# Patient Record
Sex: FEMALE | Race: BLACK OR AFRICAN AMERICAN | NOT HISPANIC OR LATINO | URBAN - METROPOLITAN AREA
[De-identification: names, ages, dates, MRNs, and addresses within clinical notes are randomized per-mention and may not be internally consistent; named-entity substitution may affect disease eponyms.]

---

## 2024-06-04 ENCOUNTER — INPATIENT (INPATIENT)
Facility: HOSPITAL | Age: 44
LOS: 9 days | Discharge: HOME | End: 2024-06-14
Attending: PSYCHIATRY & NEUROLOGY | Admitting: PSYCHIATRY & NEUROLOGY
Payer: COMMERCIAL

## 2024-06-04 VITALS
OXYGEN SATURATION: 99 % | RESPIRATION RATE: 19 BRPM | DIASTOLIC BLOOD PRESSURE: 79 MMHG | SYSTOLIC BLOOD PRESSURE: 120 MMHG | HEART RATE: 76 BPM | WEIGHT: 139.99 LBS | TEMPERATURE: 98 F

## 2024-06-04 LAB
BASOPHILS # BLD AUTO: 0.04 K/UL — SIGNIFICANT CHANGE UP (ref 0–0.2)
BASOPHILS NFR BLD AUTO: 0.5 % — SIGNIFICANT CHANGE UP (ref 0–2)
EOSINOPHIL # BLD AUTO: 0.11 K/UL — SIGNIFICANT CHANGE UP (ref 0–0.5)
EOSINOPHIL NFR BLD AUTO: 1.5 % — SIGNIFICANT CHANGE UP (ref 0–6)
HCT VFR BLD CALC: 38.5 % — SIGNIFICANT CHANGE UP (ref 34.5–45)
HGB BLD-MCNC: 13.1 G/DL — SIGNIFICANT CHANGE UP (ref 11.5–15.5)
IANC: 4.69 K/UL — SIGNIFICANT CHANGE UP (ref 1.8–7.4)
IMM GRANULOCYTES NFR BLD AUTO: 0.3 % — SIGNIFICANT CHANGE UP (ref 0–0.9)
LYMPHOCYTES # BLD AUTO: 2.02 K/UL — SIGNIFICANT CHANGE UP (ref 1–3.3)
LYMPHOCYTES # BLD AUTO: 27.4 % — SIGNIFICANT CHANGE UP (ref 13–44)
MCHC RBC-ENTMCNC: 30.3 PG — SIGNIFICANT CHANGE UP (ref 27–34)
MCHC RBC-ENTMCNC: 34 GM/DL — SIGNIFICANT CHANGE UP (ref 32–36)
MCV RBC AUTO: 89.1 FL — SIGNIFICANT CHANGE UP (ref 80–100)
MONOCYTES # BLD AUTO: 0.49 K/UL — SIGNIFICANT CHANGE UP (ref 0–0.9)
MONOCYTES NFR BLD AUTO: 6.6 % — SIGNIFICANT CHANGE UP (ref 2–14)
NEUTROPHILS # BLD AUTO: 4.69 K/UL — SIGNIFICANT CHANGE UP (ref 1.8–7.4)
NEUTROPHILS NFR BLD AUTO: 63.7 % — SIGNIFICANT CHANGE UP (ref 43–77)
NRBC # BLD: 0 /100 WBCS — SIGNIFICANT CHANGE UP (ref 0–0)
NRBC # FLD: 0 K/UL — SIGNIFICANT CHANGE UP (ref 0–0)
PLATELET # BLD AUTO: 215 K/UL — SIGNIFICANT CHANGE UP (ref 150–400)
RBC # BLD: 4.32 M/UL — SIGNIFICANT CHANGE UP (ref 3.8–5.2)
RBC # FLD: 12.5 % — SIGNIFICANT CHANGE UP (ref 10.3–14.5)
TOXICOLOGY SCREEN, DRUGS OF ABUSE, SERUM RESULT: SIGNIFICANT CHANGE UP
WBC # BLD: 7.37 K/UL — SIGNIFICANT CHANGE UP (ref 3.8–10.5)
WBC # FLD AUTO: 7.37 K/UL — SIGNIFICANT CHANGE UP (ref 3.8–10.5)

## 2024-06-04 PROCEDURE — 99285 EMERGENCY DEPT VISIT HI MDM: CPT

## 2024-06-04 NOTE — ED ADULT NURSE NOTE - CHIEF COMPLAINT QUOTE
Pt c/o suicidal ideation with "plans to drive car off the road". States increasingly stressed at home. Denies homicidal ideation, ETOH or drug use, denies A/H, V/H. Denies hx. Calm and cooperative at present.    Checo [phone]

## 2024-06-04 NOTE — ED BEHAVIORAL HEALTH NOTE - BEHAVIORAL HEALTH NOTE
As per request of provider, writer contacted met with pt’s  Checo [phone] to obtain collateral information. the following information is per the .     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 Pt endorsed SI saying she felt like giving up.  says pt even mentioned that if she happened to pass away to the children that daddy would be there to take care of them.    Symptoms/Hx:   reports pt is endorsing SI today. he is unaware of any intention or plan. He says pt has no past suicide attempts and has never mentioned feeling suicidal in the past. He says pt has not endorsed any AVH, paranoia or delusions and says there is no hx of this. he says pt  has been sleeping eating and showering at baseline. He reports today pt was tearful. He suspects pt has been holding this in for a while but says there was no warning signs.    Baseline: prays, goes to work cares for the children. He says she is loving and caring. Pt seemed at baseline yesterday.    Stressors: mother  in April. 7 yo child was sick and in the hospital for 2 days. Pt got written up at work.    Medical problems:  none reported.    Medication: none currently.    Treatment team: pt has no psych hx.    Drug/alcohol use:none reported.    Family hx: none reported.    Violence/aggression: pt is not violent or aggressive. Pt has no access to firearms or weapons.    Dispo:   feels pt would benefit from admission. As per request of provider, writer contacted met with pt’s  Checo [phone] to obtain collateral information. the following information is per the .     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 Pt endorsed SI saying she felt like giving up.  says pt even mentioned that if she happened to pass away to the children that daddy would be there to take care of them.    Symptoms/Hx:   reports pt is endorsing SI today. he is unaware of any intention or plan. He says pt has no past suicide attempts and has never mentioned feeling suicidal in the past. He says pt has not endorsed any AVH, paranoia or delusions and says there is no hx of this. he says pt  has been sleeping eating and showering at baseline. He reports today pt was tearful. He suspects pt has been holding this in for a while but says there was no warning signs.    Baseline: prays, goes to work cares for the children. He says she is loving and caring. Pt seemed at baseline yesterday.    Stressors: mother  in April. 7 yo child was sick and in the hospital for 2 days. Pt got written up at work.    Medical problems:  none reported.    Medication: none currently.    Treatment team: pt has no psych hx.    Drug/alcohol use:none reported.    Family hx: none reported.    Violence/aggression: pt is not violent or aggressive. Pt has no access to firearms or weapons.    Dispo:   feels pt would benefit from admission.    Writer informed  pt would be admitted and is boarding due to bed availability.

## 2024-06-04 NOTE — ED PROVIDER NOTE - PSYCHIATRIC [-], MLM
PM Shift Note    Pt attended wrapup group. Pt appeared to be guarded. Pt did not earn points for reward time. Pt had vanilla ice cream for her snack. Pt used the phone. Pt did take shower. Pt was compliant with night time medications. No concerns reported or observed. Pt was seen sleeping around 2115 and has been sleeping good.  Pt contracted for safety and denies SI/HI/AVH. Q 15 min safety rounds maintained.    no insomnia

## 2024-06-04 NOTE — ED PROVIDER NOTE - NSFOLLOWUPINSTRUCTIONS_ED_ALL_ED_FT
Follow up with your PMD within 48-72 hrs. Show copies of your reports given to you.   Worsening, continued or new concerning symptoms return to the emergency department.    You have been given information necessary to follow up with the  [facility] & other outpatient  psychiatric clinics within your community    • [facility] walk in Crisis centre  [address] [place] [address]  [phone] [email or web address]  Hours of operation:  Day	                                        Hours  Sunday                                  Closed  Monday                                9am - 3pm  Tuesday                                9am - 3pm  Wednesday                          9am - 3pm  Thursday                               9am - 3pm  Friday                                    9am - 3pm  Saturday                                Closed

## 2024-06-04 NOTE — ED PROVIDER NOTE - PROGRESS NOTE DETAILS
MD CHO:  I received s/o on this pt from Dr. Sharp.  Plan:  boarding for admission.  Per psych, i/p bed ready, will admit.

## 2024-06-04 NOTE — ED ADULT TRIAGE NOTE - CHIEF COMPLAINT QUOTE
Pt c/o suicidal ideation with "plans to drive car off the road". States increasingly stressed at home. Denies homicidal ideation, ETOH or drug use, denies A/H, V/H. Denies hx. Calm and cooperative at present. Pt c/o suicidal ideation with "plans to drive car off the road". States increasingly stressed at home. Denies homicidal ideation, ETOH or drug use, denies A/H, V/H. Denies hx. Calm and cooperative at present.    Checo [phone]

## 2024-06-04 NOTE — ED PROVIDER NOTE - CLINICAL SUMMARY MEDICAL DECISION MAKING FREE TEXT BOX
42 yo F no PMH p/w increased SI with plan. Admits plan to drive car off the road. Admits she feels overwhelmed: mom  in february, financial stress, buying a new home, has two girls at home age 11 and 8. Admits she does not have the heart to go through hurting herself. Denies fever, headache, dizziness, HI/AH/VH, chills, chest pain, shortness of breath, abdominal pain, sick contact or recent travel. Denies alcohol use or other drugs.   SW collateral  Psych consult  Dispo as per consult

## 2024-06-04 NOTE — ED ADULT NURSE NOTE - OBJECTIVE STATEMENT
Pt arrives to ED  from walk in triage c/o worsening SI. Pt states there has been multiple worsening stressors in their life, that initially started when pt mother passed in February of this year, then pt is experiencing financial struggles on top of attempting to purchase a home. Pt developed the plan of driving car off the road, but has not made any attempts. Pt denies ETOH or drug use, AH/VH, and HI. Pt calm and cooperative. Respirations are even and unlabored.

## 2024-06-05 DIAGNOSIS — F32.9 MAJOR DEPRESSIVE DISORDER, SINGLE EPISODE, UNSPECIFIED: ICD-10-CM

## 2024-06-05 DIAGNOSIS — F32.2 MAJOR DEPRESSIVE DISORDER, SINGLE EPISODE, SEVERE WITHOUT PSYCHOTIC FEATURES: ICD-10-CM

## 2024-06-05 DIAGNOSIS — F42.9 OBSESSIVE-COMPULSIVE DISORDER, UNSPECIFIED: ICD-10-CM

## 2024-06-05 LAB
ALBUMIN SERPL ELPH-MCNC: 4 G/DL — SIGNIFICANT CHANGE UP (ref 3.3–5)
ALP SERPL-CCNC: 52 U/L — SIGNIFICANT CHANGE UP (ref 40–120)
ALT FLD-CCNC: 9 U/L — SIGNIFICANT CHANGE UP (ref 4–33)
AMPHET UR-MCNC: NEGATIVE — SIGNIFICANT CHANGE UP
ANION GAP SERPL CALC-SCNC: 13 MMOL/L — SIGNIFICANT CHANGE UP (ref 7–14)
APAP SERPL-MCNC: <10 UG/ML — LOW (ref 15–25)
APPEARANCE UR: CLEAR — SIGNIFICANT CHANGE UP
AST SERPL-CCNC: 13 U/L — SIGNIFICANT CHANGE UP (ref 4–32)
BARBITURATES UR SCN-MCNC: NEGATIVE — SIGNIFICANT CHANGE UP
BENZODIAZ UR-MCNC: NEGATIVE — SIGNIFICANT CHANGE UP
BILIRUB SERPL-MCNC: 0.4 MG/DL — SIGNIFICANT CHANGE UP (ref 0.2–1.2)
BILIRUB UR-MCNC: NEGATIVE — SIGNIFICANT CHANGE UP
BUN SERPL-MCNC: 12 MG/DL — SIGNIFICANT CHANGE UP (ref 7–23)
CALCIUM SERPL-MCNC: 9.4 MG/DL — SIGNIFICANT CHANGE UP (ref 8.4–10.5)
CHLORIDE SERPL-SCNC: 104 MMOL/L — SIGNIFICANT CHANGE UP (ref 98–107)
CO2 SERPL-SCNC: 21 MMOL/L — LOW (ref 22–31)
COCAINE METAB.OTHER UR-MCNC: NEGATIVE — SIGNIFICANT CHANGE UP
COLOR SPEC: YELLOW — SIGNIFICANT CHANGE UP
CREAT SERPL-MCNC: 0.56 MG/DL — SIGNIFICANT CHANGE UP (ref 0.5–1.3)
CREATININE URINE RESULT, DAU: 57 MG/DL — SIGNIFICANT CHANGE UP
DIFF PNL FLD: NEGATIVE — SIGNIFICANT CHANGE UP
EGFR: 115 ML/MIN/1.73M2 — SIGNIFICANT CHANGE UP
ETHANOL SERPL-MCNC: <10 MG/DL — SIGNIFICANT CHANGE UP
FENTANYL UR QL SCN: NEGATIVE — SIGNIFICANT CHANGE UP
GLUCOSE SERPL-MCNC: 110 MG/DL — HIGH (ref 70–99)
GLUCOSE UR QL: NEGATIVE MG/DL — SIGNIFICANT CHANGE UP
HCG SERPL-ACNC: <1 MIU/ML — SIGNIFICANT CHANGE UP
KETONES UR-MCNC: NEGATIVE MG/DL — SIGNIFICANT CHANGE UP
LEUKOCYTE ESTERASE UR-ACNC: NEGATIVE — SIGNIFICANT CHANGE UP
METHADONE UR-MCNC: NEGATIVE — SIGNIFICANT CHANGE UP
NITRITE UR-MCNC: NEGATIVE — SIGNIFICANT CHANGE UP
OPIATES UR-MCNC: NEGATIVE — SIGNIFICANT CHANGE UP
OXYCODONE UR-MCNC: NEGATIVE — SIGNIFICANT CHANGE UP
PCP SPEC-MCNC: SIGNIFICANT CHANGE UP
PCP UR-MCNC: NEGATIVE — SIGNIFICANT CHANGE UP
PH UR: 7 — SIGNIFICANT CHANGE UP (ref 5–8)
POTASSIUM SERPL-MCNC: 4.1 MMOL/L — SIGNIFICANT CHANGE UP (ref 3.5–5.3)
POTASSIUM SERPL-SCNC: 4.1 MMOL/L — SIGNIFICANT CHANGE UP (ref 3.5–5.3)
PROT SERPL-MCNC: 7.2 G/DL — SIGNIFICANT CHANGE UP (ref 6–8.3)
PROT UR-MCNC: NEGATIVE MG/DL — SIGNIFICANT CHANGE UP
SALICYLATES SERPL-MCNC: <0.3 MG/DL — LOW (ref 15–30)
SARS-COV-2 RNA SPEC QL NAA+PROBE: SIGNIFICANT CHANGE UP
SODIUM SERPL-SCNC: 138 MMOL/L — SIGNIFICANT CHANGE UP (ref 135–145)
SP GR SPEC: 1.01 — SIGNIFICANT CHANGE UP (ref 1–1.03)
THC UR QL: NEGATIVE — SIGNIFICANT CHANGE UP
TSH SERPL-MCNC: 0.57 UIU/ML — SIGNIFICANT CHANGE UP (ref 0.27–4.2)
UROBILINOGEN FLD QL: 1 MG/DL — SIGNIFICANT CHANGE UP (ref 0.2–1)

## 2024-06-05 PROCEDURE — 99285 EMERGENCY DEPT VISIT HI MDM: CPT

## 2024-06-05 PROCEDURE — 99223 1ST HOSP IP/OBS HIGH 75: CPT | Mod: 1L,GS

## 2024-06-05 RX ORDER — HYDROXYZINE HCL 10 MG
25 TABLET ORAL EVERY 6 HOURS
Refills: 0 | Status: DISCONTINUED | OUTPATIENT
Start: 2024-06-05 | End: 2024-06-14

## 2024-06-05 RX ORDER — TRAZODONE HCL 50 MG
25 TABLET ORAL ONCE
Refills: 0 | Status: DISCONTINUED | OUTPATIENT
Start: 2024-06-05 | End: 2024-06-05

## 2024-06-05 RX ORDER — LANOLIN ALCOHOL/MO/W.PET/CERES
3 CREAM (GRAM) TOPICAL AT BEDTIME
Refills: 0 | Status: DISCONTINUED | OUTPATIENT
Start: 2024-06-05 | End: 2024-06-13

## 2024-06-05 RX ORDER — SERTRALINE 25 MG/1
25 TABLET, FILM COATED ORAL DAILY
Refills: 0 | Status: DISCONTINUED | OUTPATIENT
Start: 2024-06-05 | End: 2024-06-10

## 2024-06-05 NOTE — BH PATIENT PROFILE - STATED REASON FOR ADMISSION
" I just had a melt down while I was at work yesterday, my mother passed away in february. I feel overwhelmed and my  brought me to the hospital".

## 2024-06-05 NOTE — ED BEHAVIORAL HEALTH ASSESSMENT NOTE - PRIMARY DX
Current severe episode of major depressive disorder without psychotic features, unspecified whether recurrent MDD (major depressive disorder)

## 2024-06-05 NOTE — BH INPATIENT PSYCHIATRY ASSESSMENT NOTE - DESCRIPTION
Immigrated from [place], mother recently passed, has supportive spouse, employed, good relationship with children

## 2024-06-05 NOTE — BH INPATIENT PSYCHIATRY ASSESSMENT NOTE - NSDCCRITERIA_PSY_ALL_CORE
CGI<=2, return to baseline functioning as evidenced by behavioral control, staff observation, pt self report

## 2024-06-05 NOTE — ED BEHAVIORAL HEALTH ASSESSMENT NOTE - NSBHMSEMOVE_PSY_A_CORE
<<-----Click on this checkbox to enter Procedure Ventral hernia repair with mesh  10/18/2017    Active  RPRADHAN  Component separation, abdominal wall, open  10/18/2017    Active  RPRADHAN No abnormal movements

## 2024-06-05 NOTE — BH INPATIENT PSYCHIATRY ASSESSMENT NOTE - DETAILS
Has been having thoughts of driving off a maría while driving.    On 2W pt adamantly denies intent or plan to self harm and agrees to come to staff immediately with any safety concerns

## 2024-06-05 NOTE — BH INPATIENT PSYCHIATRY ASSESSMENT NOTE - NSBHCHARTREVIEWVS_PSY_A_CORE FT
Vital Signs Last 24 Hrs  T(C): 37 (06-05-24 @ 13:05), Max: 37 (06-04-24 @ 22:24)  T(F): 98.6 (06-05-24 @ 13:05), Max: 98.6 (06-04-24 @ 22:24)  HR: 66 (06-05-24 @ 13:05) (57 - 76)  BP: 104/63 (06-05-24 @ 13:05) (104/63 - 120/79)  BP(mean): --  RR: 17 (06-05-24 @ 13:05) (16 - 19)  SpO2: 98% (06-05-24 @ 13:05) (98% - 100%)     Vital Signs Last 24 Hrs  T(C): 36.6 (06-05-24 @ 13:36), Max: 37 (06-04-24 @ 22:24)  T(F): 97.8 (06-05-24 @ 13:36), Max: 98.6 (06-04-24 @ 22:24)  HR: 66 (06-05-24 @ 13:05) (57 - 68)  BP: 104/63 (06-05-24 @ 13:05) (104/63 - 114/73)  BP(mean): --  RR: 17 (06-05-24 @ 13:05) (16 - 18)  SpO2: 98% (06-05-24 @ 13:05) (98% - 100%)    Orthostatic VS  06-05-24 @ 13:36  Lying BP: --/-- HR: --  Sitting BP: 116/69 HR: 64  Standing BP: 120/80 HR: 66  Site: --  Mode: --

## 2024-06-05 NOTE — ED ADULT NURSE REASSESSMENT NOTE - NS ED NURSE REASSESS COMMENT FT1
Pt sleeping comfortably in bed, respirations are even and unlabored. VSS. NAD. Pt awaiting psych bed assignment
Pt resting comfortably in bed, respirations are even and unlabored. Pt endorsing no complaints at this time. Vitals as charted, NAD. Awaiting psych bed assignment

## 2024-06-05 NOTE — ED BEHAVIORAL HEALTH ASSESSMENT NOTE - SUMMARY
Patient is a 43 year old, female; domicile with spouse and children; employed; no formal psychiatric treatment; no known suicide attempts; no known history of violence or arrests; no active substance abuse or known history of complicated withdrawal; PMH unremarkable; brought in by spouse; for suicidal ideation.   Patient seen and evaluated. She endorses depressed mood and suicidal ideation with thoughts of driving her car off a maría. Patient reports she would not act on this because of her children. throughout interview patient was calm and cooperative. Although she endorse some symptoms of tc she is not able to provide duration of symptoms and currently does not present manic or psychotic. Current presentation does not appear to be caused by a medical condition or substance intoxication/withdrawal. Discussed the benefits of hospitalization for safety and stabilization and patient is in agreement. Patient will be admitted on a voluntary status. At this time there are no beds and patient will board in the ED overnight.

## 2024-06-05 NOTE — ED BEHAVIORAL HEALTH ASSESSMENT NOTE - RISK ASSESSMENT
moderate-high risk- current suicidal ideation with thoughts of driving her car off a maría, recent loss of mother, financial stressors, made statement to children referencing her death.   protective- treatment seeking, no prior suicide attempt, supportive spouse, engaged in work, no substance abuse

## 2024-06-05 NOTE — BH INPATIENT PSYCHIATRY ASSESSMENT NOTE - OTHER
pt reported SI in ED, but denies on 2W - pt adamantly denies intent or plan to self harm and agrees to come to staff immediately with any safety concerns

## 2024-06-05 NOTE — BH PATIENT PROFILE - FALL HARM RISK - UNIVERSAL INTERVENTIONS
Bed in lowest position, wheels locked, appropriate side rails in place/Call bell, personal items and telephone in reach/Instruct patient to call for assistance before getting out of bed or chair/Non-slip footwear when patient is out of bed/[place] to call system/Physically safe environment - no spills, clutter or unnecessary equipment/Purposeful Proactive Rounding/Room/bathroom lighting operational, light cord in reach

## 2024-06-05 NOTE — ED BEHAVIORAL HEALTH ASSESSMENT NOTE - DESCRIPTION
cooperative   ICU Vital Signs Last 24 Hrs  T(C): 37 (04 Jun 2024 22:24), Max: 37 (04 Jun 2024 22:24)  T(F): 98.6 (04 Jun 2024 22:24), Max: 98.6 (04 Jun 2024 22:24)  HR: 68 (04 Jun 2024 22:24) (68 - 76)  BP: 114/73 (04 Jun 2024 22:24) (114/73 - 120/79)  BP(mean): --  ABP: --  ABP(mean): --  RR: 18 (04 Jun 2024 22:24) (18 - 19)  SpO2: 99% (04 Jun 2024 22:24) (99% - 99%)    O2 Parameters below as of 04 Jun 2024 22:24  Patient On (Oxygen Delivery Method): room air denies Immigrated from [place], mother recently passed, has supportive spouse, employed, good relationship with children

## 2024-06-05 NOTE — BH INPATIENT PSYCHIATRY ASSESSMENT NOTE - ATTENDING COMMENTS
Care was discussed and reviewed in the interdisciplinary treatment team.  I, Nina Miller MD, have reviewed and verified the documentation.  I independently performed the documented medical decision making.    Patient was seen and evaluated in the day area. She reported that she has been struggling lately and she needed a break. She is not able to function and feels anxious all the time. At the moment denies SI and has been able to contract for safety here in the hospital. She is willing to stay voluntarily and wants to work with us on a safe discharge plan.

## 2024-06-05 NOTE — ED BEHAVIORAL HEALTH ASSESSMENT NOTE - DETAILS
spouse made aware will be provided to accepting team ages 8 and 9 y/o Has been having thoughts of driving off a maría while driving. spouse made aware. discussed with  ED team 2W Dr. Dean

## 2024-06-05 NOTE — ED BEHAVIORAL HEALTH ASSESSMENT NOTE - NSBHATTESTCOMMENTATTENDFT_PSY_A_CORE
43/F with no established psych hx; no psych admissions; no reported hx of SA and denied any hx of illicit substance use.  tonight, presented to the ED BIB  due to SI    at this time, reports feeling sad and anxious in the context of dealing with multiple psychosocial stressors.  depressive symptoms meeting MDD criteria.  along with depression, has been harboring passive SI with thoughts of driving her car off a maría BUT DENIED ANY INTENTIONS.  currently, remains affectively dysregulated; is help seeking and requesting psych admission.      whilst there was concern that there was past symptoms experience re:  ? possible tc, at this time, the Pt is not acutely manic; she is not psychotic.  Pt is not actively suicidal or homicidal. does not appear intoxicated. is not delirious.  Pt is pursuing psych admission aimed at mood stabilization and ensuring safety. once medically cleared, will facilitate transfer to [facility] on 9.13 status.  however at this time,  there are no beds available. hence she will temporarily board at the  ED

## 2024-06-05 NOTE — ED BEHAVIORAL HEALTH ASSESSMENT NOTE - PSYCHIATRIC ISSUES AND PLAN (INCLUDE STANDING AND PRN MEDICATION)
Will defer standing medication to treatment team. Ativan 0.5 mg for sleep. Ativan 2 mg po/im q6h prn agitation

## 2024-06-05 NOTE — BH INPATIENT PSYCHIATRY ASSESSMENT NOTE - NSCOMMENTSUICRISKFACT_PSY_ALL_CORE
On 2W pt adamantly denies intent or plan to self harm and agrees to come to staff immediately with any safety concerns

## 2024-06-05 NOTE — ED BEHAVIORAL HEALTH ASSESSMENT NOTE - OTHER
will board in ED overnight therapist at counseling center death of mother recently, financial constraints, work related stressors CVM, I stop

## 2024-06-05 NOTE — ED BEHAVIORAL HEALTH ASSESSMENT NOTE - HPI (INCLUDE ILLNESS QUALITY, SEVERITY, DURATION, TIMING, CONTEXT, MODIFYING FACTORS, ASSOCIATED SIGNS AND SYMPTOMS)
Patient is a 43 year old, female; domicile with spouse and children; employed; no formal psychiatric treatment; no known suicide attempts; no known history of violence or arrests; no active substance abuse or known history of complicated withdrawal; PMH unremarkable; brought in by spouse; for suicidal ideation.     Patient reports h/o depression that started in February when her mother passed. Patient reports her mother lived in [facility] and she was not able to be with her when she passed. Over the past several weeks patient has been thinking of her mother more and feels overwhelmed do to financial issues. 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 Today patient went to work and could no longer cope. She reports she called her friends crying and told her friend she wanted to end it all. Patient's friend then called her  who came to patient's work place and drove her to a nearby counselling center. Patient met with a therapist and disclosed that she was having suicidal ideation, and was told to come to the ED. Patient's spouse then drove her to [facility]. Patient currently endorses symptoms of depression including depressed mood most days of the week for at least 2 weeks. Patient is not longer to enjoy work and being with her family. She reports sleeping 2-3 hours and night but denies changes in appetite. Energy is low and concentration poor. Patient endorses suicidal ideation x 3 weeks and when driving has thoughts of wanting to drive off a maría. She states she knows she would not attempt suicide because she would not do that to her children. Patient endorses periods of abnormal amounts of energy, racing thoughts,  and multitasking in the context of poor sleep.  She is not able to state how long these symptoms last but denies engaging in risky behavior and denies rapid pressured speech. Patient denies any recent or past symptoms of psychosis.

## 2024-06-05 NOTE — BH INPATIENT PSYCHIATRY ASSESSMENT NOTE - NSBHASSESSSUMMFT_PSY_ALL_CORE
Patient is a 43 year old, female; domicile with spouse and children; employed; no formal psychiatric treatment; no known suicide attempts; no known history of violence or arrests; no active substance abuse or known history of complicated withdrawal; PMH unremarkable; brought in by spouse; for suicidal ideation.     Pt assessed for depressive sxs. Pt presents with depressed mood, tearful, feelings of guilt re: her mother passing away 2 months ago and they were not able to speak, poor concentration, poor sleep (2-3 hours a night), feeling overwhelmed, obsessive cleaning and straightening things (if she does not do this, she feels "heavy" and feels compelled to clean) to the point where friends call her "Ms. OCD" and her girlfriend forbids the pt, "don't touch my broom. Don't touch my mop" when pt goes their homes to visit. Pt reports her mother passing away 2 months ago and not being able to speak to her "we weren't on good terms and now it's too late for forgiveness," working 2 full time CNA jobs, recently closing on a home, raising 9 y/o and 12 y/o children (pt states they are safe at home with her  Checo), helping "everyone" including doing her co-workers' work and helping family and friends. Pt states that she is overwhelmed and has not been able to sleep because she thinks of all the things she still needs to do. Pt states she has never had any mental health treatment because she is "embarrassed to talk about it" "I don't want to be judged" "I don't want to look weak" and "I help everyone else but no one helps me." Discussed medications, pt declined any sleep medications, "I slept so well last night in the emergency room! For the first time!" Pt does agree to start zoloft 25 mg PO QD for depression, anxiety, and OCD sxs. Pt provided with medication education including but not limited to possible side effects like GI upset, sedation, dizziness, and metabolic changes; pt verbalized understanding. Pt encouraged to focus on her own care and attend therapy on the unit and resist her urges to help peers or clean. Will request individual therapy as well. Pt denies SIIP and agrees to come to staff immediately with any safety concerns. Pt denies HIIP, A/VH, paranoia, thought insertion/withdrawal, special abilities. Pt reports some racing thoughts and increased energy when she feels compelled to clean, but denies other s/s of tc. Diet- no pork or beef per pt request; nutrition consult placed. Atarax and melatonin PRNs added for anxiety and insomnia respectively. AM labs - A1c and lipid panel. Pt gave consent to speak to her  Checo [phone]    PMH: denies  Allergies: denies  Substances: denies    PLAN:   -voluntary admission to 2W  -start zoloft and titrate to therapeutic dose  -request individual therapy  -encourage I/M/G therapy  -obtain collaterals from    -nutrition consult   -labs in AM

## 2024-06-05 NOTE — BH INPATIENT PSYCHIATRY ASSESSMENT NOTE - NSBHMETABOLIC_PSY_ALL_CORE_FT
BMI:   HbA1c:   Glucose:   BP: 104/63 (06-05-24 @ 13:05) (104/63 - 120/79)Vital Signs Last 24 Hrs  T(C): 37 (06-05-24 @ 13:05), Max: 37 (06-04-24 @ 22:24)  T(F): 98.6 (06-05-24 @ 13:05), Max: 98.6 (06-04-24 @ 22:24)  HR: 66 (06-05-24 @ 13:05) (57 - 76)  BP: 104/63 (06-05-24 @ 13:05) (104/63 - 120/79)  BP(mean): --  RR: 17 (06-05-24 @ 13:05) (16 - 19)  SpO2: 98% (06-05-24 @ 13:05) (98% - 100%)      Lipid Panel:  BMI: BMI (kg/m2): 25.6 (06-05-24 @ 13:36)  HbA1c:   Glucose:   BP: 104/63 (06-05-24 @ 13:05) (104/63 - 120/79)Vital Signs Last 24 Hrs  T(C): 36.6 (06-05-24 @ 13:36), Max: 37 (06-04-24 @ 22:24)  T(F): 97.8 (06-05-24 @ 13:36), Max: 98.6 (06-04-24 @ 22:24)  HR: 66 (06-05-24 @ 13:05) (57 - 68)  BP: 104/63 (06-05-24 @ 13:05) (104/63 - 114/73)  BP(mean): --  RR: 17 (06-05-24 @ 13:05) (16 - 18)  SpO2: 98% (06-05-24 @ 13:05) (98% - 100%)    Orthostatic VS  06-05-24 @ 13:36  Lying BP: --/-- HR: --  Sitting BP: 116/69 HR: 64  Standing BP: 120/80 HR: 66  Site: --  Mode: --    Lipid Panel:

## 2024-06-05 NOTE — BH INPATIENT PSYCHIATRY ASSESSMENT NOTE - CURRENT MEDICATION
MEDICATIONS  (STANDING):  sertraline 25 milliGRAM(s) Oral daily    MEDICATIONS  (PRN):  hydrOXYzine hydrochloride 25 milliGRAM(s) Oral every 6 hours PRN anxiety  LORazepam     Tablet 2 milliGRAM(s) Oral every 6 hours PRN Agitation  LORazepam   Injectable 2 milliGRAM(s) IntraMuscular Once PRN Agitation  melatonin. 3 milliGRAM(s) Oral at bedtime PRN Insomnia

## 2024-06-05 NOTE — BH INPATIENT PSYCHIATRY ASSESSMENT NOTE - HPI (INCLUDE ILLNESS QUALITY, SEVERITY, DURATION, TIMING, CONTEXT, MODIFYING FACTORS, ASSOCIATED SIGNS AND SYMPTOMS)
Per ED assessment: "Patient is a 43 year old, female; domicile with spouse and children; employed; no formal psychiatric treatment; no known suicide attempts; no known history of violence or arrests; no active substance abuse or known history of complicated withdrawal; PMH unremarkable; brought in by spouse; for suicidal ideation.     Patient reports h/o depression that started in February when her mother passed. Patient reports her mother lived in [place] and she was not able to be with her when she passed. Over the past several weeks patient has been thinking of her mother more and feels overwhelmed do to financial issues. 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 Today patient went to work and could no longer cope. She reports she called her friends crying and told her friend she wanted to end it all. Patient's friend then called her  who came to patient's work place and drove her to a nearby counselling center. Patient met with a therapist and disclosed that she was having suicidal ideation, and was told to come to the ED. Patient's spouse then drove her to [facility]. Patient currently endorses symptoms of depression including depressed mood most days of the week for at least 2 weeks. Patient is not longer to enjoy work and being with her family. She reports sleeping 2-3 hours and night but denies changes in appetite. Energy is low and concentration poor. Patient endorses suicidal ideation x 3 weeks and when driving has thoughts of wanting to drive off a maría. She states she knows she would not attempt suicide because she would not do that to her children. Patient endorses periods of abnormal amounts of energy, racing thoughts,  and multitasking in the context of poor sleep.  She is not able to state how long these symptoms last but denies engaging in risky behavior and denies rapid pressured speech. Patient denies any recent or past symptoms of psychosis."

## 2024-06-05 NOTE — ED BEHAVIORAL HEALTH ASSESSMENT NOTE - NSBHATTESTAPPAMEND_PSY_A_CORE
I have personally seen and examined this patient. I fully participated in the care of this patient. I have made amendments to the documentation where appropriate and otherwise agree with the history, physical exam, and plan as documented by the MONA

## 2024-06-05 NOTE — ED BEHAVIORAL HEALTH ASSESSMENT NOTE - NSBHROSSYSTEMS_PSY_ALL_CORE
Pt denied any headaches, no dizziness, no blurring of vision; no sorethroat; no cough, no fever. no chest pains, no SOB, no palpitations, no abdominal pains, no nausea/ vomiting/ diarrhea, no dysuria, no hesitancy, no arthralgia/ no pruritus. denied any muscle/ joint pains

## 2024-06-05 NOTE — BH INPATIENT PSYCHIATRY ASSESSMENT NOTE - NSBHATTESTAPPBILLTIME_PSY_A_CORE
I attest my time as MONA is greater than 50% of the total combined time spent on qualifying patient care activities. I have reviewed and verified the documentation.

## 2024-06-06 LAB
A1C WITH ESTIMATED AVERAGE GLUCOSE RESULT: 5.5 % — SIGNIFICANT CHANGE UP (ref 4–5.6)
CHOLEST SERPL-MCNC: 179 MG/DL — SIGNIFICANT CHANGE UP
ESTIMATED AVERAGE GLUCOSE: 111 — SIGNIFICANT CHANGE UP
HDLC SERPL-MCNC: 73 MG/DL — SIGNIFICANT CHANGE UP
LIPID PNL WITH DIRECT LDL SERPL: 96 MG/DL — SIGNIFICANT CHANGE UP
NON HDL CHOLESTEROL: 106 MG/DL — SIGNIFICANT CHANGE UP
TRIGL SERPL-MCNC: 50 MG/DL — SIGNIFICANT CHANGE UP

## 2024-06-06 PROCEDURE — 99232 SBSQ HOSP IP/OBS MODERATE 35: CPT

## 2024-06-06 RX ADMIN — SERTRALINE 25 MILLIGRAM(S): 25 TABLET, FILM COATED ORAL at 08:18

## 2024-06-06 NOTE — PSYCHIATRIC REHAB INITIAL EVALUATION - NSBHPRRECOMMEND_PSY_ALL_CORE
The writer met with the patient to orient her to psychiatric rehabilitation staff and services. Per the patient’s chart, she was BIB her spouse for suicidal ideation.  The patient reported she came to the hospital because she felt very anxious and overwhelmed with work and the recent passing of her mom. The writer established a psychiatric rehabilitation goal for the patient to work on over the next seven days. The patient’s psychiatric rehabilitation goal is to identify and practice 3 coping skills to manage anxiety for her anxiety/panic/fear goal. Over the next seven days, Psychiatric Rehabilitation staff will utilize individual psychotherapy and psychoeducation to assist the patient in reaching her treatment goal. The patient denied SI/HI/AH/VH.

## 2024-06-06 NOTE — BH INPATIENT PSYCHIATRY PROGRESS NOTE - OTHER
pt reported SI in ED, but denies on 2W - pt adamantly denies intent or plan to self harm and agrees to come to staff immediately with any safety concerns upset, tearful

## 2024-06-06 NOTE — DIETITIAN INITIAL EVALUATION ADULT - PERTINENT LABORATORY DATA
06-04    138  |  104  |  12  ----------------------------<  110<H>  4.1   |  21<L>  |  0.56    Ca    9.4      04 Jun 2024 23:22    TPro  7.2  /  Alb  4.0  /  TBili  0.4  /  DBili  x   /  AST  13  /  ALT  9   /  AlkPhos  52  06-04

## 2024-06-06 NOTE — BH INPATIENT PSYCHIATRY PROGRESS NOTE - NSBHASSESSSUMMFT_PSY_ALL_CORE
Patient is a 43 year old, female; domicile with spouse and children; employed; no formal psychiatric treatment; no known suicide attempts; no known history of violence or arrests; no active substance abuse or known history of complicated withdrawal; PMH unremarkable; brought in by spouse; for suicidal ideation.     today pt tearful, missing her children, started on zoloft 25 mg PO QD. Will monitor for effect     PLAN:   -voluntary admission to 2W  -start zoloft and titrate to therapeutic dose  -request individual therapy  -encourage I/M/G therapy  -obtain collaterals from    -nutrition consult   -labs in AM

## 2024-06-06 NOTE — BH INPATIENT PSYCHIATRY PROGRESS NOTE - NSBHMETABOLIC_PSY_ALL_CORE_FT
BMI: BMI (kg/m2): 25.6 (06-05-24 @ 13:36)  HbA1c: A1C with Estimated Average Glucose Result: 5.5 % (06-06-24 @ 09:00)    Glucose:   BP: 104/63 (06-05-24 @ 13:05) (104/63 - 120/79)Vital Signs Last 24 Hrs  T(C): 36.4 (06-06-24 @ 07:23), Max: 36.4 (06-06-24 @ 07:23)  T(F): 97.6 (06-06-24 @ 07:23), Max: 97.6 (06-06-24 @ 07:23)  HR: --  BP: --  BP(mean): --  RR: 18 (06-06-24 @ 07:23) (18 - 18)  SpO2: 99% (06-06-24 @ 07:23) (99% - 99%)    Orthostatic VS  06-06-24 @ 07:23  Lying BP: --/-- HR: --  Sitting BP: 100/71 HR: 69  Standing BP: 98/66 HR: 69  Site: --  Mode: --  Orthostatic VS  06-05-24 @ 13:36  Lying BP: --/-- HR: --  Sitting BP: 116/69 HR: 64  Standing BP: 120/80 HR: 66  Site: --  Mode: --    Lipid Panel: Date/Time: 06-06-24 @ 09:00  Cholesterol, Serum: 179  LDL Cholesterol Calculated: 96  HDL Cholesterol, Serum: 73  Total Cholesterol/HDL Ration Measurement: --  Triglycerides, Serum: 50

## 2024-06-06 NOTE — BH INPATIENT PSYCHIATRY PROGRESS NOTE - NSBHCHARTREVIEWVS_PSY_A_CORE FT
Vital Signs Last 24 Hrs  T(C): 36.4 (06-06-24 @ 07:23), Max: 36.4 (06-06-24 @ 07:23)  T(F): 97.6 (06-06-24 @ 07:23), Max: 97.6 (06-06-24 @ 07:23)  HR: --  BP: --  BP(mean): --  RR: 18 (06-06-24 @ 07:23) (18 - 18)  SpO2: 99% (06-06-24 @ 07:23) (99% - 99%)    Orthostatic VS  06-06-24 @ 07:23  Lying BP: --/-- HR: --  Sitting BP: 100/71 HR: 69  Standing BP: 98/66 HR: 69  Site: --  Mode: --  Orthostatic VS  06-05-24 @ 13:36  Lying BP: --/-- HR: --  Sitting BP: 116/69 HR: 64  Standing BP: 120/80 HR: 66  Site: --  Mode: --

## 2024-06-06 NOTE — BH INPATIENT PSYCHIATRY PROGRESS NOTE - NSBHFUPINTERVALHXFT_PSY_A_CORE
Pt assessed for depression and SI. Chart reviewed and case discussed with treatment team. No interval events reported overnight. Pt seen with Dr. Dean and medical student present. Pt observed to be singing hymns quite loudly in her room. On approach, pt singing loudly and tearful. Pt states she needs to leave and be with her children, "I'm not crazy." Pt reminded that she was admitted for depression and feeling overwhelmed, "I worked myself too hard, that's all." Support provided. Pt stated singing was a coping mechanism for her. Pt then went to take a shower. Pt's room noted to be very clean and pt made frequent adjustments to her bed though she made it with perfect hospital corners. Pt met with psychology fellow for therapy. Started zoloft 25 mg PO QD today.

## 2024-06-06 NOTE — DIETITIAN INITIAL EVALUATION ADULT - ORAL INTAKE PTA/DIET HISTORY
Patient denies changes to her appetite/PO intake at home, states she has good PO intake PTA. NKFA reported. Does not consume beef/pork. Used to take daily Multivitamin but self-stopped the past 2 months (due to depression).

## 2024-06-06 NOTE — DIETITIAN INITIAL EVALUATION ADULT - OTHER INFO
Patient is a 44 y/o female domicile with spouse and children; employed; no formal psychiatric treatment; no known suicide attempts; no known history of violence or arrests; no active substance abuse or known history of complicated withdrawal; PMH unremarkable; brought in by spouse; for suicidal ideation.     Met with patient in the dining area today. Patient reports her current appetite remains good with good PO intake at breakfast this AM, denies chewing/swallowing difficulties on current diet. No beef/pork, also dislikes milk (ok with other dairy); menu options explored with patient today, food preferences taken and honored on CBoard. Patient denies GI distress (nausea/vomiting/diarrhea/ constipation) at this time.

## 2024-06-06 NOTE — BH PSYCHOLOGY - CLINICIAN PSYCHOTHERAPY NOTE - NSBHPSYCHOLNARRATIVE_PSY_A_CORE FT
Clinician met with the patient for 30 minutes per treatment team request. The patient presented with depressed mood and affect. The thought process was linear and thought content was logical. Patient had fair insight and judgment. Patient denied current SI/HI. Pt was future oriented.    The patient discussed feeling depressed, tearful, and having difficulty sleeping. Patient has been working two jobs to distract herself from her emotions, and feels guilty about her relationship with her late mother. Patient believes that emotions are a weakness, but is open to the idea that they can be a strength. Clinician and patient discussed her feelings of grief, sadness, and anxiety, and have planned to work on responding to her emotions skillfully and effectively.    Clinician and patient will meet on Monday June 10th.

## 2024-06-07 PROCEDURE — 99232 SBSQ HOSP IP/OBS MODERATE 35: CPT

## 2024-06-07 RX ADMIN — SERTRALINE 25 MILLIGRAM(S): 25 TABLET, FILM COATED ORAL at 08:34

## 2024-06-07 NOTE — BH INPATIENT PSYCHIATRY PROGRESS NOTE - NSBHASSESSSUMMFT_PSY_ALL_CORE
Patient is a 43 year old, female; domicile with spouse and children; employed; no formal psychiatric treatment; no known suicide attempts; no known history of violence or arrests; no active substance abuse or known history of complicated withdrawal; PMH unremarkable; brought in by spouse; for suicidal ideation.     today pt tearful, missing her children, started on zoloft 25 mg PO QD. Will monitor for effect     PLAN:   -voluntary admission to 2W  -start zoloft and titrate to therapeutic dose  -request individual therapy  -encourage I/M/G therapy  -obtain collaterals from    -nutrition consult   -labs in AM Patient is a 43 year old, female; domicile with spouse and children; employed; no formal psychiatric treatment; no known suicide attempts; no known history of violence or arrests; no active substance abuse or known history of complicated withdrawal; PMH unremarkable; brought in by spouse; for suicidal ideation.     today pt with brighter affect, stating she felt better today and enjoyed therapy session yesterday with psychologist, reports some mild loose stools with meds, agrees to c/w zoloft 25 mg PO QD. Will monitor for effect     PLAN:   -voluntary admission to 2W  -start zoloft and titrate to therapeutic dose  -request individual therapy  -encourage I/M/G therapy  -obtain collaterals from    -nutrition consult   -labs in AM

## 2024-06-07 NOTE — BH INPATIENT PSYCHIATRY PROGRESS NOTE - NSBHFUPINTERVALHXFT_PSY_A_CORE
Pt assessed for depression and SI. Chart reviewed and case discussed with treatment team. No interval events reported overnight. Pt seen with  medical student present. Pt observed laying in bed. Pt states she is feeling better today and she feels her mind is "empty" of worries today. She is looking forward to seeing her children for a supervised visit this afternoon arranged with nursing. Pt denies SIIP, HIIP, A/VH, or paranoia. Pt reports some mild loose stool she associates with starting zoloft. Pt states she sings "when I'm happy, sad, busy, sleepy, I sing for anything." c/w zoloft 25 mg PO QD

## 2024-06-07 NOTE — BH SOCIAL WORK INITIAL PSYCHOSOCIAL EVALUATION - NSBHSPIRITUALSUPPORT_PSY_ALL_CORE
Encounter addended by: Arlin Powers, PT on: 8/30/2017 11:12 AM<BR>     Actions taken: Flowsheet data copied forward, Flowsheet accepted
Yes

## 2024-06-07 NOTE — BH SOCIAL WORK INITIAL PSYCHOSOCIAL EVALUATION - OTHER PAST PSYCHIATRIC HISTORY (INCLUDE DETAILS REGARDING ONSET, COURSE OF ILLNESS, INPATIENT/OUTPATIENT TREATMENT)
Patient is a 43F, , domiciled with spouse and 2 children (ages 8 and 10 y/o); employed with 2 full time CNA jobs, no significant PMH, NKDFA, no formal psychiatric treatment, no prior psychiatric admissions, no known suicide attempts or NSSIB,  no active substance abuse or known history of complicated withdrawal, no known history of violence or arrests, no access to firearms, who presented to [facility] ED BIB spouse for suicidal ideation. Patient now on 2W for further psychiatric stabilization.     In ED, Patient reports h/o depression that started in February when her mother passed. Patient reports her mother lived in [facility] and she was not able to be with her when she passed. Over the past several weeks patient has been thinking of her mother more and feels overwhelmed do to financial issues. 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 Today patient went to work and could no longer cope. She reports she called her friends crying and told her friend she wanted to end it all. Patient's friend then called her  who came to patient's work place and drove her to a nearby counselling center. Patient met with a therapist and disclosed that she was having suicidal ideation, and was told to come to the ED. Patient's spouse then drove her to [facility]. Patient currently endorses symptoms of depression including depressed mood most days of the week for at least 2 weeks. Patient is not longer to enjoy work and being with her family. She reports sleeping 2-3 hours and night but denies changes in appetite. Energy is low and concentration poor. Patient endorses suicidal ideation x 3 weeks and when driving has thoughts of wanting to drive off a maría. She states she knows she would not attempt suicide because she would not do that to her children. Patient endorses periods of abnormal amounts of energy, racing thoughts,  and multitasking in the context of poor sleep.  She is not able to state how long these symptoms last but denies engaging in risky behavior and denies rapid pressured speech. Patient denies any recent or past symptoms of psychosis.    As per collateral obtained in ED from Patient's  [place] ([phone]): pt’s friend called  saying pt was having a breakdown. Pt endorsed SI saying she felt like giving up.  says pt even mentioned that if she happened to pass away to the children that lázaro would be there to take care of them.  reports Pt is endorsing SI today. he is unaware of any intention or plan. He says pt has no past suicide attempts and has never mentioned feeling suicidal in the past. He says pt has not endorsed any AVH, paranoia or delusions and says there is no hx of this. he says pt  has been sleeping eating and showering at baseline. He reports today pt was tearful. He suspects pt has been holding this in for a while but says there was no warning signs. At baseline, Patient prays, goes to work cares for the children. He says she is loving and caring.    Patient seen by Writer at bedside. Patient is calm, cooperative, and pleasant on approach. Patient signed consents for her spouse and best friend. Patient reports prior to admission, she felt "overwhelmed" due to multiple psychosocial stressors. She confirms she has never had or required psychiatric treatment before, but is accepting of it now. Patient reports her mother passes in February, but did not give herself time to properly grieve, as she was working 2 jobs and closing on a house. She reports after closing and moving into the house, she did not feel any happiness. Patient endorses anhedonia, poor sleep, and low energy.  Patient endorses suicidal ideation over the last several weeks when driving has thoughts of wanting to drive off a maría. She states she knows she would not attempt suicide and cites her children as her protective factors. Patient states she recognizes she needs more time for herself and to be able to rest, and has already talked to her  about her quitting one of her CNA jobs. Writer discussed referral and discharge process. Supportive therapy and psychoeducation was provided involving discussion of diagnosis, general prognosis / what to expect next, and treatment options including medications and therapy and how each might relate to alleviate their most distressing symptoms and help them achieve their goals of care. SW will provide Patient and her family with support, psychoeducation, and discharge planning, while coordinating care with interdisciplinary treatment team.

## 2024-06-07 NOTE — BH INPATIENT PSYCHIATRY PROGRESS NOTE - OTHER
pt reported SI in ED, but denies on 2W - pt adamantly denies intent or plan to self harm and agrees to come to staff immediately with any safety concerns upset, tearful "I feel better"

## 2024-06-07 NOTE — BH INPATIENT PSYCHIATRY PROGRESS NOTE - NSBHCHARTREVIEWVS_PSY_A_CORE FT
Vital Signs Last 24 Hrs  T(C): 36.4 (06-07-24 @ 08:18), Max: 36.4 (06-07-24 @ 08:18)  T(F): 97.6 (06-07-24 @ 08:18), Max: 97.6 (06-07-24 @ 08:18)  HR: --  BP: --  BP(mean): --  RR: 19 (06-07-24 @ 08:18) (19 - 19)  SpO2: --    Orthostatic VS  06-07-24 @ 08:18  Lying BP: --/-- HR: --  Sitting BP: 105/69 HR: 68  Standing BP: 105/65 HR: 67  Site: --  Mode: --  Orthostatic VS  06-06-24 @ 07:23  Lying BP: --/-- HR: --  Sitting BP: 100/71 HR: 69  Standing BP: 98/66 HR: 69  Site: --  Mode: --

## 2024-06-07 NOTE — BH SOCIAL WORK INITIAL PSYCHOSOCIAL EVALUATION - NSCMSPTSTRENGTHS_PSY_ALL_CORE
[place]/spirituality/Financial stability/Future/goal oriented/Intact family/Physically healthy/Self-reliant/Supportive family

## 2024-06-07 NOTE — BH INPATIENT PSYCHIATRY PROGRESS NOTE - NSBHMETABOLIC_PSY_ALL_CORE_FT
BMI: BMI (kg/m2): 25.6 (06-05-24 @ 13:36)  HbA1c: A1C with Estimated Average Glucose Result: 5.5 % (06-06-24 @ 09:00)    Glucose:   BP: 104/63 (06-05-24 @ 13:05) (104/63 - 120/79)Vital Signs Last 24 Hrs  T(C): 36.4 (06-07-24 @ 08:18), Max: 36.4 (06-07-24 @ 08:18)  T(F): 97.6 (06-07-24 @ 08:18), Max: 97.6 (06-07-24 @ 08:18)  HR: --  BP: --  BP(mean): --  RR: 19 (06-07-24 @ 08:18) (19 - 19)  SpO2: --    Orthostatic VS  06-07-24 @ 08:18  Lying BP: --/-- HR: --  Sitting BP: 105/69 HR: 68  Standing BP: 105/65 HR: 67  Site: --  Mode: --  Orthostatic VS  06-06-24 @ 07:23  Lying BP: --/-- HR: --  Sitting BP: 100/71 HR: 69  Standing BP: 98/66 HR: 69  Site: --  Mode: --    Lipid Panel: Date/Time: 06-06-24 @ 09:00  Cholesterol, Serum: 179  LDL Cholesterol Calculated: 96  HDL Cholesterol, Serum: 73  Total Cholesterol/HDL Ration Measurement: --  Triglycerides, Serum: 50

## 2024-06-07 NOTE — BH SOCIAL WORK INITIAL PSYCHOSOCIAL EVALUATION - NSPROPTADDSUPPORTPHONE_GEN_A_NUR
Outpatient Occupational Therapy Lymphedema Treatment Note  [facility]     Patient Name: Emely Solomon  : 1960  MRN: [id number]  Today's Date: 2017      Visit Date: 2017    Patient Active Problem List   Diagnosis   • Chronic diastolic congestive heart failure   • PFO (patent foramen ovale)   • Paradoxical embolism   • Hypertension   • Pulmonary hypertension        Past Medical History:   Diagnosis Date   • Chronic diastolic congestive heart failure    • Deep venous thrombosis    • Hypertension    • Lipoedema    • Lymphedema    • Morbid obesity    • Paradoxical embolism     to the LLE due to DVT/PE and PFO   • PFO (patent foramen ovale)    • Pulmonary embolism    • Pulmonary hypertension     multifactorial (dCHF, obesity/ARIEL, hx PE), mild by echo 2016        Past Surgical History:   Procedure Laterality Date   • BRONCHOSCOPY N/A 2017    Procedure: BRONCHOSCOPY with wash;  Surgeon: Caleb Garcia MD;  Location: [facility] ENDOSCOPY;  Service:    • DILATATION AND CURETTAGE  2011   • VENA CAVA FILTER PLACEMENT      Inferior Vena Cava Filter Placement w/Fluorosc angiogr guidance         Visit Dx:      ICD-10-CM ICD-9-CM   1. Lymphedema of both lower extremities I89.0 457.1   2. Lipoedema R60.9 782.3   3. Decreased mobility and endurance Z74.09 780.99   4. Cellulitis of left lower extremity L03.116 682.6             Lymphedema       17 1300          Subjective Comments    Subjective Comments --   No pain c/o. Was able to wear the bandages all night.   -CW      Subjective Pain    Able to rate subjective pain? yes  -CW      Pre-Treatment Pain Level 0  -CW      Post-Treatment Pain Level 0  -CW      Pain Assessment    Pain Assessment No/denies pain  -CW      Skin Changes/Observations    Location/Assessment Lower Extremity  -CW      Lower Extremity Conditions bilateral:;shiny;scab(s);inflamed;fragile  -CW      Lower Extremity Color/Pigment left:   Skin red L lower leg.   -CW      Lower  Extremity Skin Details Red scratches from pt. itching R lower leg diminished.   -CW      Skin Observations Comment No weeping or drainage. Some red skin patches/sores noted LLE. Blister R lower leg no drainage.   -CW      Manual Lymphatic Drainage    Manual Lymphatic Drainage --   Neck, axilla, abd., groin, RLE. Focused on prox. and RLE.   -CW      Manual Lymphatic Drainage Comments No pain during MLD.   -CW      Compression/Skin Care    Compression/Skin Care skin care;wrapping location;bandaging;compression garment  -CW      Skin Care moisturizing lotion applied   zinc oxide to skin folds behind L knee and B shin area.   -CW      Wrapping Location lower extremity  -CW      Wrapping Location LE bilateral:;ankle;knee   wrapped R ankle to knee so her shoes fit.   -CW      Wrapping Comments Bandages were removed at home.   -CW      Bandage Layers cotton liner;soft foam- 1/2 inch;short-stretch bandages (comment size/quantity)  -CW      Bandaging Technique circumferential/spiral;moderate compression  -CW      Bandaging Comments BLE-K1, 2- Rosidal soft, 1- 8cm. 4- 10 cm. Rosidal. Extra Artiflex to B ankle area.   -CW        User Key  (r) = Recorded By, (t) = Taken By, (c) = Cosigned By    Initials Name Provider Type    RADHA Acosta Occupational Therapist                        OT Assessment/Plan       09/19/17 1345       OT Assessment    Assessment Comments No weeping or draining BLE's. Skin LLE is red, but not warm/hot or as inflammed. Pt. tolerated the compression bandages well including the extra bandage. Reviewed bandage precautions and wearing schedule. Encouraged pt. to do HEP at home and reviewed the importance of consistency. Called [place] regarding her insurance coverage for garments or velcro compression.   -CW     OT Plan    OT Plan Comments Plan to cont tx.   -CW       User Key  (r) = Recorded By, (t) = Taken By, (c) = Cosigned By    Initials Name Provider Type    RADHA Acosta Occupational  Therapist                                Therapy Education       09/19/17 5286          Therapy Education    Given Bandaging/dressing change;Edema management  -CW      Program Reinforced  -CW      How Provided Verbal  -CW      Provided to Patient  -CW      Level of Understanding Verbalized  -CW        User Key  (r) = Recorded By, (t) = Taken By, (c) = Cosigned By    Initials Name Provider Type    CW RADHA Miller Occupational Therapist                  Time Calculation:   OT Start Time: 0905  OT Stop Time: 1008  OT Time Calculation (min): 63 min  OT Non-Billable Time (min): 15 min       Therapy Charges for Today     Code Description Service Date Service Provider Modifiers Qty    61378924105 HC OT MANUAL THERAPY EA 15 MIN 9/19/2017 RADHA Miller GO 4    32346737764 HC OT CARE PLAN EA 15 MIN 9/19/2017 RADHA Miller GO 1                      RADHA Miller  9/19/2017   [id number]-

## 2024-06-07 NOTE — BH SOCIAL WORK INITIAL PSYCHOSOCIAL EVALUATION - NSBHEMPLOYEDISSUES_PSY_ALL_CORE
Patient called with concerns about her medication.   
Patient was prescribed clindamycin and fluconazole for vaginal discharge. RN unable to see recent lab results. Patient c/o ongoing symptoms.     Patient does not have a PCP and is not able to be seen by an OBGYN.     Per Dr. Murray's note, patient should f/u here at the urgent care. RN recommended patient return for re-evaluation.   
None

## 2024-06-08 PROCEDURE — 99232 SBSQ HOSP IP/OBS MODERATE 35: CPT

## 2024-06-08 RX ADMIN — SERTRALINE 25 MILLIGRAM(S): 25 TABLET, FILM COATED ORAL at 09:39

## 2024-06-08 NOTE — BH INPATIENT PSYCHIATRY PROGRESS NOTE - NSBHCHARTREVIEWVS_PSY_A_CORE FT
Vital Signs Last 24 Hrs  T(C): 36.7 (06-08-24 @ 08:24), Max: 36.7 (06-08-24 @ 08:24)  T(F): 98 (06-08-24 @ 08:24), Max: 98 (06-08-24 @ 08:24)  HR: --  BP: --  BP(mean): --  RR: 18 (06-08-24 @ 08:24) (18 - 18)  SpO2: --    Orthostatic VS  06-08-24 @ 08:24  Lying BP: --/-- HR: --  Sitting BP: 109/61 HR: 68  Standing BP: 105/72 HR: 78  Site: --  Mode: --  Orthostatic VS  06-07-24 @ 08:18  Lying BP: --/-- HR: --  Sitting BP: 105/69 HR: 68  Standing BP: 105/65 HR: 67  Site: --  Mode: --

## 2024-06-08 NOTE — BH INPATIENT PSYCHIATRY PROGRESS NOTE - NSBHMETABOLIC_PSY_ALL_CORE_FT
BMI: BMI (kg/m2): 25.6 (06-05-24 @ 13:36)  HbA1c: A1C with Estimated Average Glucose Result: 5.5 % (06-06-24 @ 09:00)    Glucose:   BP: 104/63 (06-05-24 @ 13:05) (104/63 - 104/63)Vital Signs Last 24 Hrs  T(C): 36.7 (06-08-24 @ 08:24), Max: 36.7 (06-08-24 @ 08:24)  T(F): 98 (06-08-24 @ 08:24), Max: 98 (06-08-24 @ 08:24)  HR: --  BP: --  BP(mean): --  RR: 18 (06-08-24 @ 08:24) (18 - 18)  SpO2: --    Orthostatic VS  06-08-24 @ 08:24  Lying BP: --/-- HR: --  Sitting BP: 109/61 HR: 68  Standing BP: 105/72 HR: 78  Site: --  Mode: --  Orthostatic VS  06-07-24 @ 08:18  Lying BP: --/-- HR: --  Sitting BP: 105/69 HR: 68  Standing BP: 105/65 HR: 67  Site: --  Mode: --    Lipid Panel: Date/Time: 06-06-24 @ 09:00  Cholesterol, Serum: 179  LDL Cholesterol Calculated: 96  HDL Cholesterol, Serum: 73  Total Cholesterol/HDL Ration Measurement: --  Triglycerides, Serum: 50

## 2024-06-08 NOTE — BH INPATIENT PSYCHIATRY PROGRESS NOTE - NSBHASSESSSUMMFT_PSY_ALL_CORE
Patient is a 43 year old, female; domicile with spouse and children; employed; no formal psychiatric treatment; no known suicide attempts; no known history of violence or arrests; no active substance abuse or known history of complicated withdrawal; PMH unremarkable; brought in by spouse; for suicidal ideation.     today pt with brighter affect, stating she felt better today and enjoyed therapy session yesterday with psychologist, reports some mild loose stools with meds, agrees to c/w zoloft 25 mg PO QD. Will monitor for effect     PLAN:   -voluntary admission to 2W  -start zoloft and titrate to therapeutic dose  -request individual therapy  -encourage I/M/G therapy  -obtain collaterals from    -nutrition consult   -labs in AM

## 2024-06-08 NOTE — BH INPATIENT PSYCHIATRY PROGRESS NOTE - OTHER
pt reported SI in ED, but denies on 2W - pt adamantly denies intent or plan to self harm and agrees to come to staff immediately with any safety concerns "I feel better"

## 2024-06-08 NOTE — BH INPATIENT PSYCHIATRY PROGRESS NOTE - NSBHFUPINTERVALHXFT_PSY_A_CORE
Patient was seen and evaluated for depression and anxiety.  Chart, medications and labs reviewed. Case discussed with nursing team.  No interval events. Patient is compliant with medications. No SE reported.  No behavioral concerns, no prns for aggression. Eating and sleeping well.     Patient is seen on the unit. Patient is calm, cooperative and is amenable to interview.  Patient reports feeling “ doing a little better” she denies SI/SIB/HI no plan or intent, engages in safety plan on the unit. Reports her anxiety level is manageable.  Patient reports triggers to her depression: death of mother in February, feels she has not adequately grief her death (guilt surrounding mothers death because they did not get along) other stressor includes: working 2 jobs, feeling overwhelmed by finances.   Patient denies AVH, disorganization, delusions or tc. No acute medical concerns,  no pain/discomfort. VSS: 98, 109/61, 68, 13 Continue to monitor and provide therapeutic support.

## 2024-06-09 PROCEDURE — 99232 SBSQ HOSP IP/OBS MODERATE 35: CPT

## 2024-06-09 RX ADMIN — SERTRALINE 25 MILLIGRAM(S): 25 TABLET, FILM COATED ORAL at 09:19

## 2024-06-09 NOTE — BH INPATIENT PSYCHIATRY PROGRESS NOTE - NSBHCHARTREVIEWVS_PSY_A_CORE FT
Vital Signs Last 24 Hrs  T(C): 36.7 (06-08-24 @ 08:24), Max: 36.7 (06-08-24 @ 08:24)  T(F): 98 (06-08-24 @ 08:24), Max: 98 (06-08-24 @ 08:24)  HR: --  BP: --  BP(mean): --  RR: 18 (06-08-24 @ 08:24) (18 - 18)  SpO2: --    Orthostatic VS  06-08-24 @ 08:24  Lying BP: --/-- HR: --  Sitting BP: 109/61 HR: 68  Standing BP: 105/72 HR: 78  Site: --  Mode: --  Orthostatic VS  06-07-24 @ 08:18  Lying BP: --/-- HR: --  Sitting BP: 105/69 HR: 68  Standing BP: 105/65 HR: 67  Site: --  Mode: --   Vital Signs Last 24 Hrs  T(C): 36.8 (06-09-24 @ 07:48), Max: 36.8 (06-09-24 @ 07:48)  T(F): 98.2 (06-09-24 @ 07:48), Max: 98.2 (06-09-24 @ 07:48)  HR: --  BP: --  BP(mean): --  RR: --  SpO2: --    Orthostatic VS  06-09-24 @ 07:48  Lying BP: --/-- HR: --  Sitting BP: 108/65 HR: 63  Standing BP: 100/60 HR: 73  Site: --  Mode: --  Orthostatic VS  06-08-24 @ 08:24  Lying BP: --/-- HR: --  Sitting BP: 109/61 HR: 68  Standing BP: 105/72 HR: 78  Site: --  Mode: --

## 2024-06-09 NOTE — BH INPATIENT PSYCHIATRY PROGRESS NOTE - NSBHFUPINTERVALHXFT_PSY_A_CORE
Patient was seen and evaluated for depression and anxiety.  Chart, medications and labs reviewed. Case discussed with nursing team.  No interval events. Patient is compliant with medications. No SE reported.  No behavioral concerns, no prns for aggression. Eating and sleeping well.     Patient is seen on the unit. Patient is calm, cooperative with brighter affect and is amenable to interview.  Patient reports feeling “better” she denies SI/SIB/HI no plan or intent, engages in safety plan on the unit. Reports her anxiety level is low/manageable. Patient given literature on coping with depression and “safety plan” pt seemed appreciative   Patient denies AVH, disorganization, delusions or tc. No acute medical concerns, no pain/discomfort. VSS: 98.2, 108/65, 63.  Continue to monitor and provide therapeutic support.

## 2024-06-09 NOTE — BH INPATIENT PSYCHIATRY PROGRESS NOTE - NSBHMETABOLIC_PSY_ALL_CORE_FT
BMI: BMI (kg/m2): 25.6 (06-05-24 @ 13:36)  HbA1c: A1C with Estimated Average Glucose Result: 5.5 % (06-06-24 @ 09:00)    Glucose:   BP: --Vital Signs Last 24 Hrs  T(C): 36.7 (06-08-24 @ 08:24), Max: 36.7 (06-08-24 @ 08:24)  T(F): 98 (06-08-24 @ 08:24), Max: 98 (06-08-24 @ 08:24)  HR: --  BP: --  BP(mean): --  RR: 18 (06-08-24 @ 08:24) (18 - 18)  SpO2: --    Orthostatic VS  06-08-24 @ 08:24  Lying BP: --/-- HR: --  Sitting BP: 109/61 HR: 68  Standing BP: 105/72 HR: 78  Site: --  Mode: --  Orthostatic VS  06-07-24 @ 08:18  Lying BP: --/-- HR: --  Sitting BP: 105/69 HR: 68  Standing BP: 105/65 HR: 67  Site: --  Mode: --    Lipid Panel: Date/Time: 06-06-24 @ 09:00  Cholesterol, Serum: 179  LDL Cholesterol Calculated: 96  HDL Cholesterol, Serum: 73  Total Cholesterol/HDL Ration Measurement: --  Triglycerides, Serum: 50   BMI: BMI (kg/m2): 25.6 (06-05-24 @ 13:36)  HbA1c: A1C with Estimated Average Glucose Result: 5.5 % (06-06-24 @ 09:00)    Glucose:   BP: --Vital Signs Last 24 Hrs  T(C): 36.8 (06-09-24 @ 07:48), Max: 36.8 (06-09-24 @ 07:48)  T(F): 98.2 (06-09-24 @ 07:48), Max: 98.2 (06-09-24 @ 07:48)  HR: --  BP: --  BP(mean): --  RR: --  SpO2: --    Orthostatic VS  06-09-24 @ 07:48  Lying BP: --/-- HR: --  Sitting BP: 108/65 HR: 63  Standing BP: 100/60 HR: 73  Site: --  Mode: --  Orthostatic VS  06-08-24 @ 08:24  Lying BP: --/-- HR: --  Sitting BP: 109/61 HR: 68  Standing BP: 105/72 HR: 78  Site: --  Mode: --    Lipid Panel: Date/Time: 06-06-24 @ 09:00  Cholesterol, Serum: 179  LDL Cholesterol Calculated: 96  HDL Cholesterol, Serum: 73  Total Cholesterol/HDL Ration Measurement: --  Triglycerides, Serum: 50

## 2024-06-10 PROCEDURE — 99232 SBSQ HOSP IP/OBS MODERATE 35: CPT

## 2024-06-10 RX ORDER — SERTRALINE 25 MG/1
50 TABLET, FILM COATED ORAL DAILY
Refills: 0 | Status: DISCONTINUED | OUTPATIENT
Start: 2024-06-10 | End: 2024-06-14

## 2024-06-10 RX ADMIN — SERTRALINE 25 MILLIGRAM(S): 25 TABLET, FILM COATED ORAL at 08:42

## 2024-06-10 NOTE — BH INPATIENT PSYCHIATRY PROGRESS NOTE - NSBHASSESSSUMMFT_PSY_ALL_CORE
Patient is a 43 year old, female; domicile with spouse and children; employed; no formal psychiatric treatment; no known suicide attempts; no known history of violence or arrests; no active substance abuse or known history of complicated withdrawal; PMH unremarkable; brought in by spouse; for suicidal ideation.     today pt with brighter affect, stating she felt better today, denies any further mild loose stools with meds, endorses continued OCD like sxs of wanting to clean, denies SIIP. agrees to increase zoloft  to 50 mg PO QD. Will monitor for effect. Agrees to PHP referral     PLAN:   -voluntary admission to 2W  -start zoloft and titrate to therapeutic dose  -request individual therapy  -encourage I/M/G therapy  -obtain collaterals from    -nutrition consult   -labs in AM

## 2024-06-10 NOTE — BH INPATIENT PSYCHIATRY PROGRESS NOTE - NSBHCHARTREVIEWVS_PSY_A_CORE FT
Vital Signs Last 24 Hrs  T(C): 36.4 (06-10-24 @ 08:39), Max: 36.4 (06-10-24 @ 08:39)  T(F): 97.6 (06-10-24 @ 08:39), Max: 97.6 (06-10-24 @ 08:39)  HR: --  BP: --  BP(mean): --  RR: 18 (06-10-24 @ 08:39) (18 - 18)  SpO2: 99% (06-10-24 @ 08:39) (99% - 99%)    Orthostatic VS  06-10-24 @ 08:39  Lying BP: --/-- HR: --  Sitting BP: 98/64 HR: 86  Standing BP: 97/62 HR: 84  Site: --  Mode: --  Orthostatic VS  06-09-24 @ 07:48  Lying BP: --/-- HR: --  Sitting BP: 108/65 HR: 63  Standing BP: 100/60 HR: 73  Site: --  Mode: --

## 2024-06-10 NOTE — BH PSYCHOLOGY - CLINICIAN PSYCHOTHERAPY NOTE - NSBHPSYCHOLNARRATIVE_PSY_A_CORE FT
Clinician met with the patient for 45 minutes per treatment team request. The patient presented with improving depressed mood and affect. The thought process was linear and thought content was logical. Patient had fair insight and judgment. Patient denied current SI/HI. Pt was future oriented.    During session, patient and clinician focused on the patient’s early childhood memories associated with her mother. The patient revealed that she had been avoiding processing a difficult memory due to feelings of embarrassment and shame. Clinician and patient processed this memory. We discussed the significance of confronting these emotions by opening up about her experiences. Emphasized the power of storytelling and the importance of sharing personal experiences with a trusted individual. We explored the healing potential of continuing individual therapy with a trusted clinician. Additionally, explored the impact of the patient’s relationship with her mother on her current parenting style. The patient expressed motivation to speak more openly about her experiences.    Clinician and patient will meet on Thursday June 13th. Clinician met with the patient for 45 minutes per treatment team request. The patient presented with improving depressed mood and affect. The thought process was linear and thought content was logical. Patient had fair insight and judgment. Patient denied current SI/HI. Pt was future oriented.    During session, patient and clinician focused on the patient’s early childhood memories associated with her mother. The patient revealed that she had been avoiding processing a difficult memory due to feelings of embarrassment and shame. Clinician and patient processed this memory. We discussed the significance of confronting these emotions by opening up about her experiences. Emphasized the power of storytelling and the importance of sharing personal experiences with a trusted individual. We explored the healing potential of continuing individual therapy with a trusted clinician. Additionally, explored the impact of the patient’s relationship with her mother on her current parenting style. The Pt expressed motivation to speak more openly about her experiences.    Clinician and patient will meet on Thursday June 13th.

## 2024-06-10 NOTE — BH INPATIENT PSYCHIATRY PROGRESS NOTE - NSBHMETABOLIC_PSY_ALL_CORE_FT
BMI: BMI (kg/m2): 25.6 (06-05-24 @ 13:36)  HbA1c: A1C with Estimated Average Glucose Result: 5.5 % (06-06-24 @ 09:00)    Glucose:   BP: --Vital Signs Last 24 Hrs  T(C): 36.4 (06-10-24 @ 08:39), Max: 36.4 (06-10-24 @ 08:39)  T(F): 97.6 (06-10-24 @ 08:39), Max: 97.6 (06-10-24 @ 08:39)  HR: --  BP: --  BP(mean): --  RR: 18 (06-10-24 @ 08:39) (18 - 18)  SpO2: 99% (06-10-24 @ 08:39) (99% - 99%)    Orthostatic VS  06-10-24 @ 08:39  Lying BP: --/-- HR: --  Sitting BP: 98/64 HR: 86  Standing BP: 97/62 HR: 84  Site: --  Mode: --  Orthostatic VS  06-09-24 @ 07:48  Lying BP: --/-- HR: --  Sitting BP: 108/65 HR: 63  Standing BP: 100/60 HR: 73  Site: --  Mode: --    Lipid Panel: Date/Time: 06-06-24 @ 09:00  Cholesterol, Serum: 179  LDL Cholesterol Calculated: 96  HDL Cholesterol, Serum: 73  Total Cholesterol/HDL Ration Measurement: --  Triglycerides, Serum: 50

## 2024-06-10 NOTE — BH INPATIENT PSYCHIATRY PROGRESS NOTE - NSBHFUPINTERVALHXFT_PSY_A_CORE
Pt assessed for depression and SI. Chart reviewed and case discussed with treatment team. No interval events reported overnight. Pt seen with  medical student present. Pt observed laying in bed. Pt states she is feeling better today and has decided to stop one of her full time jobs because her 9 y/o daughter asked pt why she was "killing herself" working so much, "She's my little therapist and ." Pt denies SIIP, HIIP, A/VH, or paranoia, but endorses continued difficulty refraining from cleaning things around her. "I don't feel heavy anymore, but I have to talk to myself to keep from doing it." Pt agrees to further increase in zoloft to 50 mg PO QD. Pt denies any further loose stool she associated with starting zoloft. Pt agreeable to PHP referral

## 2024-06-11 PROCEDURE — 99232 SBSQ HOSP IP/OBS MODERATE 35: CPT

## 2024-06-11 RX ADMIN — SERTRALINE 50 MILLIGRAM(S): 25 TABLET, FILM COATED ORAL at 09:46

## 2024-06-11 NOTE — BH INPATIENT PSYCHIATRY PROGRESS NOTE - NSBHASSESSSUMMFT_PSY_ALL_CORE
Patient is a 43 year old, female; domicile with spouse and children; employed; no formal psychiatric treatment; no known suicide attempts; no known history of violence or arrests; no active substance abuse or known history of complicated withdrawal; PMH unremarkable; brought in by spouse; for suicidal ideation.     today pt with tearful recounting the stressors in her life as above, stating she felt better today and wants to get stronger for herself and her children, endorses continued OCD like sxs of wanting to clean, denies SIIP. agrees to c/w increase zoloft  to 50 mg PO QD. Will monitor for effect. Agrees to PHP referral     PLAN:   -voluntary admission to 2W  -start zoloft and titrate to therapeutic dose  -request individual therapy  -encourage I/M/G therapy  -obtain collaterals from    -nutrition consult   -labs in AM

## 2024-06-11 NOTE — BH INPATIENT PSYCHIATRY PROGRESS NOTE - NSBHFUPINTERVALHXFT_PSY_A_CORE
Pt assessed for depression and SI. Chart reviewed and case discussed with treatment team. No interval events reported overnight. 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 Pt relates that her family have said many hurtful things about her because she chose to have children at 33 y/o instead of earlier. Pt relates that she felt she had to be an adult when she was 6 y/o "because my mother would rather be with her boyfriend than take care of us." Pt states she took over parenting duties at 6 y/o and has not stopped since, "I take care of everyone but me." Pt discusses her resentments towards her mother. Support provided. Pt states she wants to be stronger for herself and felt therapy helped yesterday and would like to continue this as an outpt. Pt denies SIIP, HIIP, A/VH, or paranoia, but endorses continued difficulty refraining from cleaning things around her and needs to talk herself out of cleaning things around her. Pt agrees to c/w further increase in zoloft to 50 mg PO QD. Pt agreeable to PHP referral

## 2024-06-11 NOTE — BH INPATIENT PSYCHIATRY PROGRESS NOTE - NSBHCHARTREVIEWVS_PSY_A_CORE FT
Vital Signs Last 24 Hrs  T(C): 37.3 (06-11-24 @ 07:58), Max: 37.3 (06-11-24 @ 07:58)  T(F): 99.2 (06-11-24 @ 07:58), Max: 99.2 (06-11-24 @ 07:58)  HR: --  BP: --  BP(mean): --  RR: 19 (06-11-24 @ 07:58) (19 - 19)  SpO2: --    Orthostatic VS  06-11-24 @ 07:58  Lying BP: --/-- HR: --  Sitting BP: 110/66 HR: 75  Standing BP: 104/67 HR: 86  Site: --  Mode: --  Orthostatic VS  06-10-24 @ 08:39  Lying BP: --/-- HR: --  Sitting BP: 98/64 HR: 86  Standing BP: 97/62 HR: 84  Site: --  Mode: --

## 2024-06-11 NOTE — BH INPATIENT PSYCHIATRY PROGRESS NOTE - NSBHMETABOLIC_PSY_ALL_CORE_FT
BMI: BMI (kg/m2): 25.6 (06-05-24 @ 13:36)  HbA1c: A1C with Estimated Average Glucose Result: 5.5 % (06-06-24 @ 09:00)    Glucose:   BP: --Vital Signs Last 24 Hrs  T(C): 37.3 (06-11-24 @ 07:58), Max: 37.3 (06-11-24 @ 07:58)  T(F): 99.2 (06-11-24 @ 07:58), Max: 99.2 (06-11-24 @ 07:58)  HR: --  BP: --  BP(mean): --  RR: 19 (06-11-24 @ 07:58) (19 - 19)  SpO2: --    Orthostatic VS  06-11-24 @ 07:58  Lying BP: --/-- HR: --  Sitting BP: 110/66 HR: 75  Standing BP: 104/67 HR: 86  Site: --  Mode: --  Orthostatic VS  06-10-24 @ 08:39  Lying BP: --/-- HR: --  Sitting BP: 98/64 HR: 86  Standing BP: 97/62 HR: 84  Site: --  Mode: --    Lipid Panel: Date/Time: 06-06-24 @ 09:00  Cholesterol, Serum: 179  LDL Cholesterol Calculated: 96  HDL Cholesterol, Serum: 73  Total Cholesterol/HDL Ration Measurement: --  Triglycerides, Serum: 50

## 2024-06-12 PROBLEM — Z78.9 OTHER SPECIFIED HEALTH STATUS: Chronic | Status: ACTIVE | Noted: 2024-06-04

## 2024-06-12 PROCEDURE — 99232 SBSQ HOSP IP/OBS MODERATE 35: CPT

## 2024-06-12 RX ADMIN — SERTRALINE 50 MILLIGRAM(S): 25 TABLET, FILM COATED ORAL at 09:16

## 2024-06-12 NOTE — BH INPATIENT PSYCHIATRY PROGRESS NOTE - NSBHFUPINTERVALHXFT_PSY_A_CORE
Pt assessed for depression, OCD, and SI. Chart reviewed and case discussed with care team. Pt seen with Gela Wolf NP and medical student present. Pt states "I'm fine" with regard to her mood and OCD. Roommate made a mess last night after showering and pt reports "giving in" to her compulsions to clean, but that after cleaning the mess in the bathroom she was able to sleep. Pt states that she loves herself too much to even thinking about harming herself; states she was too afraid to ask for help before she was admitted. Expecting a baby visit this afternoon during which the pt states she spends a lot of time doing her daughters' hair. Partial program discussed with patient as plan for d/c; pt expresses disinterest in attending partial program if the wait is what is keeping her from d/c. She has plans this Saturday and regrets the possibility of not spending father's day with her . Denies SHIIP, VH, AH; no evidence of paranoia or delusions. Pt appears euthymic and more relaxed. Pt consistently attends and participates in groups. Plan to pursue partial program acceptance and continue with Zoloft at current dose of 50mg.  Pt assessed for depression, OCD, and SI. Chart reviewed and case discussed with care team. Pt seen with Gela Smith NP and medical student present. Pt states "I'm fine" with regard to her mood and OCD. Roommate made a mess last night after showering and pt reports "giving in" to her compulsions to clean, but that after cleaning the mess in the bathroom she was able to sleep. Pt states that she loves herself too much to even thinking about harming herself; states she was too afraid to ask for help before she was admitted. Expecting a baby visit this afternoon during which the pt states she spends a lot of time doing her daughters' hair. Partial program discussed and pt continues to agree to referral, but frustrated with wait for intake appt. Denies SHIIP, VH, AH; no evidence of paranoia or delusions. Pt appears euthymic and more relaxed. Pt consistently attends and participates in groups. Plan to pursue partial program acceptance and continue with Zoloft at current dose of 50mg.

## 2024-06-12 NOTE — BH INPATIENT PSYCHIATRY PROGRESS NOTE - NSBHMETABOLIC_PSY_ALL_CORE_FT
BMI: BMI (kg/m2): 25.6 (06-05-24 @ 13:36)  HbA1c: A1C with Estimated Average Glucose Result: 5.5 % (06-06-24 @ 09:00)    Glucose:   BP: --Vital Signs Last 24 Hrs  T(C): 36.4 (06-12-24 @ 08:37), Max: 36.4 (06-12-24 @ 08:37)  T(F): 97.6 (06-12-24 @ 08:37), Max: 97.6 (06-12-24 @ 08:37)  HR: --  BP: --  BP(mean): --  RR: 18 (06-12-24 @ 08:37) (18 - 18)  SpO2: 99% (06-12-24 @ 08:37) (99% - 99%)    Orthostatic VS  06-12-24 @ 08:37  Lying BP: --/-- HR: --  Sitting BP: 102/62 HR: 58  Standing BP: 98/66 HR: 75  Site: --  Mode: --  Orthostatic VS  06-11-24 @ 07:58  Lying BP: --/-- HR: --  Sitting BP: 110/66 HR: 75  Standing BP: 104/67 HR: 86  Site: --  Mode: --    Lipid Panel: Date/Time: 06-06-24 @ 09:00  Cholesterol, Serum: 179  LDL Cholesterol Calculated: 96  HDL Cholesterol, Serum: 73  Total Cholesterol/HDL Ration Measurement: --  Triglycerides, Serum: 50

## 2024-06-12 NOTE — BH INPATIENT PSYCHIATRY PROGRESS NOTE - NSBHASSESSSUMMFT_PSY_ALL_CORE
Patient is a 43 year old, female; domicile with spouse and children; employed; no formal psychiatric treatment; no known suicide attempts; no known history of violence or arrests; no active substance abuse or known history of complicated withdrawal; PMH unremarkable; brought in by spouse; for suicidal ideation.     today pt reports improved mood and anxiety, endorses continued OCD like sxs of wanting to clean, denies SIIP. agrees to c/w increase zoloft  to 50 mg PO QD. Will monitor for effect. Agrees to PHP referral - pending intake appt     PLAN:   -voluntary admission to 2W  -start zoloft and titrate to therapeutic dose  -request individual therapy  -encourage I/M/G therapy  -obtain collaterals from    -nutrition consult   -labs in AM WNL

## 2024-06-12 NOTE — BH INPATIENT PSYCHIATRY PROGRESS NOTE - NSBHCHARTREVIEWVS_PSY_A_CORE FT
Vital Signs Last 24 Hrs  T(C): 36.4 (06-12-24 @ 08:37), Max: 36.4 (06-12-24 @ 08:37)  T(F): 97.6 (06-12-24 @ 08:37), Max: 97.6 (06-12-24 @ 08:37)  HR: --  BP: --  BP(mean): --  RR: 18 (06-12-24 @ 08:37) (18 - 18)  SpO2: 99% (06-12-24 @ 08:37) (99% - 99%)    Orthostatic VS  06-12-24 @ 08:37  Lying BP: --/-- HR: --  Sitting BP: 102/62 HR: 58  Standing BP: 98/66 HR: 75  Site: --  Mode: --  Orthostatic VS  06-11-24 @ 07:58  Lying BP: --/-- HR: --  Sitting BP: 110/66 HR: 75  Standing BP: 104/67 HR: 86  Site: --  Mode: --

## 2024-06-13 PROCEDURE — 99232 SBSQ HOSP IP/OBS MODERATE 35: CPT

## 2024-06-13 RX ORDER — LANOLIN ALCOHOL/MO/W.PET/CERES
3 CREAM (GRAM) TOPICAL AT BEDTIME
Refills: 0 | Status: DISCONTINUED | OUTPATIENT
Start: 2024-06-13 | End: 2024-06-14

## 2024-06-13 RX ADMIN — SERTRALINE 50 MILLIGRAM(S): 25 TABLET, FILM COATED ORAL at 09:18

## 2024-06-13 NOTE — BH INPATIENT PSYCHIATRY PROGRESS NOTE - NSBHFUPINTERVALHXFT_PSY_A_CORE
Pt assessed for depression, OCD, and SI. Chart reviewed and case discussed with care team. Pt seen with medical student present. Pt states she feels improvements in mood and OCD sxs. Pt advocates for discharge, but also agrees that she would like to have more therapy as an outpt, "I kept everything inside for too long." Partial program discussed and pt continues to agree to referral, but frustrated with wait for intake appt. Denies SHIIP, , ; no evidence of paranoia or delusions. Pt consistently attends and participates in groups. Plan to pursue partial program acceptance and continue with Zoloft at current dose of 50mg. Will add melatonin 3 mg PO QHS for insomnia

## 2024-06-13 NOTE — BH PSYCHOLOGY - CLINICIAN PSYCHOTHERAPY NOTE - NSBHPSYCHOLINT_PSY_A_CORE
Dialectical  Behavioral Therapy (DBT)/Supported coping skills
Dynamic issues addressed
Dialectical  Behavioral Therapy (DBT)/Dynamic issues addressed

## 2024-06-13 NOTE — BH INPATIENT PSYCHIATRY DISCHARGE NOTE - ATTENDING DISCHARGE PHYSICAL EXAMINATION:
Care was discussed and reviewed in the interdisciplinary treatment team.  I, Nina Miller MD, have reviewed and verified the documentation.  I independently performed the documented medical decision making.     Patient was seen and evaluated today by this writer. Patient reported that she is feeling much better and is looking forward to being able to leave the hospital. Patient is denying any SI and has been able to contract for safety. Denies any HI or hallucinations and no delusions have been elicited. The patient has been educated about the safety plan and understands that if she feels like harming herself or others, she can call 911 or go to any ER. During the hospital stay, the patient has not demonstrated any aggressive or self-injurious behaviors, and this has been consistent with my observations and the observations of the staff.

## 2024-06-13 NOTE — BH INPATIENT PSYCHIATRY DISCHARGE NOTE - OTHER PAST PSYCHIATRIC HISTORY (INCLUDE DETAILS REGARDING ONSET, COURSE OF ILLNESS, INPATIENT/OUTPATIENT TREATMENT)
Patient is a 43F, , domiciled with spouse and 2 children (ages 8 and 12 y/o); employed with 2 full time CNA jobs, no significant PMH, NKDFA, no formal psychiatric treatment, no prior psychiatric admissions, no known suicide attempts or NSSIB,  no active substance abuse or known history of complicated withdrawal, no known history of violence or arrests, no access to firearms, who presented to [facility] ED BIB spouse for suicidal ideation. Patient now on 2W for further psychiatric stabilization.     In ED, Patient reports h/o depression that started in February when her mother passed. Patient reports her mother lived in [facility] and she was not able to be with her when she passed. Over the past several weeks patient has been thinking of her mother more and feels overwhelmed do to financial issues. 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 Today patient went to work and could no longer cope. She reports she called her friends crying and told her friend she wanted to end it all. Patient's friend then called her  who came to patient's work place and drove her to a nearby counselling center. Patient met with a therapist and disclosed that she was having suicidal ideation, and was told to come to the ED. Patient's spouse then drove her to [facility]. Patient currently endorses symptoms of depression including depressed mood most days of the week for at least 2 weeks. Patient is not longer to enjoy work and being with her family. She reports sleeping 2-3 hours and night but denies changes in appetite. Energy is low and concentration poor. Patient endorses suicidal ideation x 3 weeks and when driving has thoughts of wanting to drive off a maría. She states she knows she would not attempt suicide because she would not do that to her children. Patient endorses periods of abnormal amounts of energy, racing thoughts,  and multitasking in the context of poor sleep.  She is not able to state how long these symptoms last but denies engaging in risky behavior and denies rapid pressured speech. Patient denies any recent or past symptoms of psychosis.    As per collateral obtained in ED from Patient's  [place] ([phone]): pt’s friend called  saying pt was having a breakdown. Pt endorsed SI saying she felt like giving up.  says pt even mentioned that if she happened to pass away to the children that lázaro would be there to take care of them.  reports Pt is endorsing SI today. he is unaware of any intention or plan. He says pt has no past suicide attempts and has never mentioned feeling suicidal in the past. He says pt has not endorsed any AVH, paranoia or delusions and says there is no hx of this. he says pt  has been sleeping eating and showering at baseline. He reports today pt was tearful. He suspects pt has been holding this in for a while but says there was no warning signs. At baseline, Patient prays, goes to work cares for the children. He says she is loving and caring.    Patient seen by Writer at bedside. Patient is calm, cooperative, and pleasant on approach. Patient signed consents for her spouse and best friend. Patient reports prior to admission, she felt "overwhelmed" due to multiple psychosocial stressors. She confirms she has never had or required psychiatric treatment before, but is accepting of it now. Patient reports her mother passes in February, but did not give herself time to properly grieve, as she was working 2 jobs and closing on a house. She reports after closing and moving into the house, she did not feel any happiness. Patient endorses anhedonia, poor sleep, and low energy.  Patient endorses suicidal ideation over the last several weeks when driving has thoughts of wanting to drive off a maría. She states she knows she would not attempt suicide and cites her children as her protective factors. Patient states she recognizes she needs more time for herself and to be able to rest, and has already talked to her  about her quitting one of her CNA jobs. Writer discussed referral and discharge process. Supportive therapy and psychoeducation was provided involving discussion of diagnosis, general prognosis / what to expect next, and treatment options including medications and therapy and how each might relate to alleviate their most distressing symptoms and help them achieve their goals of care. SW will provide Patient and her family with support, psychoeducation, and discharge planning, while coordinating care with interdisciplinary treatment team.

## 2024-06-13 NOTE — BH PSYCHOLOGY - GROUP THERAPY NOTE - NSPSYCHOLGRPDBTPT_PSY_A_CORE
stable mood and affect in group/patient showing good behavior control/Patient able to identify mood states/other...
stable mood and affect in group/patient showing good behavior control/Patient able to identify mood states/other...
patient showing good behavior control/Patient able to identify mood states/other...

## 2024-06-13 NOTE — BH PSYCHOLOGY - GROUP THERAPY NOTE - NSBHPSYCHOLRESPCOMMENT_PSY_A_CORE FT
The patient appeared adequately groomed and casually dressed. Pt was engaged in the group as evidenced by participating in group discussion. Pt was reflective on mindful exercise and shared observations. Pt discussed how check the facts is a skill she can use when she starts to worry and overthink. Pt volunteered to read from the worksheet when given the opportunity. Pt was oriented X3. Patient was open to feedback, and she was appropriate with her peers. Pt found the group to be helpful. Pt was encouraged to practice skill check the facts for symptom reduction.
The patient appeared adequately groomed and casually dressed. Pt was engaged in the group as evidenced by participating in group discussion. Pt discussed her perception of emotions as weakness and was able to discuss how emotions have utility. Pt volunteered to read from the worksheet when given the opportunity. Pt was oriented X3. Patient was open to feedback, and she was appropriate with her peers. Pt was encouraged to continue reflection of emotional state. Pt found the group to be helpful. 
The patient appeared adequately groomed and dressed in a hospital gown. Pt was engaged in the group as evidenced by participating in group discussion. Pt discussed her personal experiences as related to using dialectics. Pt was open to embracing change and using skills to practice balancing opposite sides. Pt was an active member in group. Pt volunteered to read from the worksheet when given the opportunity. Pt was oriented X3. Patient was open to feedback, and she was appropriate with her peers. Pt was encouraged to practice skill how to think and act dialectically for symptom reduction. Pt found the group to be helpful and stated ways she will practice what was discussed in group.

## 2024-06-13 NOTE — BH TREATMENT PLAN - NSTXPLANTHERAPYSESSIONSFT_PSY_ALL_CORE
06-13-24  Type of therapy: Other  Type of session: Individual  Level of patient participation: Engaged  Duration of participation: 15 minutes  Therapy conducted by: Psych rehab  Therapy Summary: The writer met with the patient to assess progress of their psychiatric rehabilitation goal over the past week. The patient made improvement towards her goal to identify and practice 3 coping skills to manage anxiety for her anxiety/panic/fear goal. The patient reported deep breathing, stay grounded, remind myself to not be so hard on myself as her coping skills. The patient is compliant with her medications, engages with her peers, and has poor ADL’s and behavioral control. The writer completed the patient’s safety plan during this session. The patient attended 60% of the Psychiatric Rehabilitation groups in the past seven days. Over the next seven days, Psychiatric Rehabilitation staff will continue to provide encouragement, support, psychotherapy, and psychoeducation to assist the patient in the progression of her treatment goal and engagement with activities. The patient denied SI/HI/AH/VH.

## 2024-06-13 NOTE — BH INPATIENT PSYCHIATRY DISCHARGE NOTE - HPI (INCLUDE ILLNESS QUALITY, SEVERITY, DURATION, TIMING, CONTEXT, MODIFYING FACTORS, ASSOCIATED SIGNS AND SYMPTOMS)
Per ED assessment: "Patient is a 43 year old, female; domicile with spouse and children; employed; no formal psychiatric treatment; no known suicide attempts; no known history of violence or arrests; no active substance abuse or known history of complicated withdrawal; PMH unremarkable; brought in by spouse; for suicidal ideation.     Patient reports h/o depression that started in February when her mother passed. Patient reports her mother lived in [facility] and she was not able to be with her when she passed. Over the past several weeks patient has been thinking of her mother more and feels overwhelmed do to financial issues. 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 Today patient went to work and could no longer cope. She reports she called her friends crying and told her friend she wanted to end it all. Patient's friend then called her  who came to patient's work place and drove her to a nearby counselling center. Patient met with a therapist and disclosed that she was having suicidal ideation, and was told to come to the ED. Patient's spouse then drove her to [facility]. Patient currently endorses symptoms of depression including depressed mood most days of the week for at least 2 weeks. Patient is not longer to enjoy work and being with her family. She reports sleeping 2-3 hours and night but denies changes in appetite. Energy is low and concentration poor. Patient endorses suicidal ideation x 3 weeks and when driving has thoughts of wanting to drive off a maría. She states she knows she would not attempt suicide because she would not do that to her children. Patient endorses periods of abnormal amounts of energy, racing thoughts,  and multitasking in the context of poor sleep.  She is not able to state how long these symptoms last but denies engaging in risky behavior and denies rapid pressured speech. Patient denies any recent or past symptoms of psychosis."

## 2024-06-13 NOTE — BH TREATMENT PLAN - NSTXDEPRESINTERRN_PSY_ALL_CORE
Encourage to verbalize feelings.Provide teaching on effective coping skills ,stress management.
Provide education on coping skills that aid in mitigating feelings of depression; encourage use of coping skills after education is provided by staff when pt is feeling depressed; redirect as needed/indicated

## 2024-06-13 NOTE — BH PSYCHOLOGY - GROUP THERAPY NOTE - NSPSYCHOLGRPDBTPROB_PSY_A_CORE
anger/anxiety/depressed mood/emotional dysregulation/impulsive behaviors/labile affect/poor judgment
anger/anxiety/depressed mood/emotional dysregulation/labile affect
anger/anxiety/depressed mood/emotional dysregulation/impulsive behaviors/irritability/labile affect/poor judgment/social isolation

## 2024-06-13 NOTE — BH INPATIENT PSYCHIATRY DISCHARGE NOTE - DISCHARGE DATE
OB INTAKE INTERVIEW  Patient is 28 y.o.y.o. who presents for OB intake at 9 wks  She is accompanied by herself during this encounter  The father of her baby Reuben Huggins is involved in the pregnancy and is 28years old.   Last Menstrual Period: 10/3/2023  Ultrasound: Measured 8 weeks 6 days on 2023  Estimated Date of Delivery: 24 confirmed by 8 week US    Signs/Symptoms of Pregnancy  Current pregnancy symptoms: nausea, fatigue  Constipation YES  Headaches no  Cramping/spotting no  PICA cravings no    Diabetes-  Body mass index is 29.01 kg/m². If patient has 1 or more, please order early 1 hour GTT  History of GDM no  BMI >35 no  History of PCOS or current metformin use no  History of LGA/macrosomic infant (4000g/9lbs) no    If patient has 2 or more, please order early 1 hour GTT  BMI>30 no  AMA no  First degree relative with type 2 diabetes no  History of chronic HTN, hyperlipidemia, elevated A1C YES  High risk race (, , ,  or ) no    Hypertension- if you answer yes, please order preeclampsia labs (cbc, comprehensive metabolic panel, urine protein creatinine ratio, uric acid)  History of of chronic HTN YES  History of gestational HTN no  History of preeclampsia, eclampsia, or HELLP syndrome no  History of diabetes no  History of lupus, autoimmune disease, kidney disease YES, kidney disease (genetic but can't do further testing due to her father being ), RA     Thyroid- if yes order TSH with reflex T4  History of thyroid disease no    Bleeding Disorder or Hx of DVT-patient or first degree relative with history of. Order the following if not done previously.    (Factor V, antithrombin III, prothrombin gene mutation, protein C and S Ag, lupus anticoagulant, anticardiolipin, beta-2 glycoprotein)   no    OB/GYN-  History of abnormal pap smear no       Date of last pap smear 2022  History of HPV no  History of Herpes/HSV no  History of other STI (gonorrhea, chlamydia, trich) no  History of prior  no  History of prior  no  History of  delivery prior to 36 weeks 6 days no  History of blood transfusion no  Ok for blood transfusion YES    Substance screening- if yes outside of tobacco for her or anyone in her home-order urine drug screen  History of tobacco use no  Currently using tobacco no  Currently using alcohol no  Presently using drugs no  Past drug use  no  IV drug use- no  Partner drug use no  Parent/Family drug use no    MRSA Screening-   Does the pt have a hx of MRSA? no  If yes- please follow MRSA protocol and obtain a nasal swab for MRSA culture    Immunizations:  Influenza vaccine given this season YES  Discussed Tdap vaccine YES  Discussed COVID Vaccine YES    Genetic/MFM-  Do you or your partner have a history of any of the following in yourselves or first degree relatives? Cystic fibrosis no  Spinal muscular atrophy no  Hemoglobinopathy/Sickle Cell/Thalassemia no  Fragile X Intellectual Disability no    If yes, discuss Carrier Screening and recommend consultation with MFM/genetic counseling and place specific MFM Referral for. If no, discuss Carrier Screening being completed once in a lifetime as a standard of care lab test along with Nuchal Ultrasound.  Place orders for [id number] and [id number] along with MFM Referral.  Patient interested NO- DONE ALREADY    Appointment at [address] [facility] made YES APPOINTMENT 24    Interview education  [facility] Pregnancy Essentials Book reviewed, discussed and attached to their AVS YES    Nurse/Family Partnership- patient may qualify NO; referral placed NO    Prenatal lab work scripts YES  Extra labs ordered:  Cmp  Pro/creat ratio  Uric acid      Aspirin/Preeclampsia Screen    Risk Level Risk Factor Recommendation   LOW Prior Uncomplicated full-term delivery no No Aspirin recommendation        MODERATE Nulliparity YES Recommend low-dose aspirin if     BMI>30 no 2 or more moderate risk factors    Family History Preeclampsia (mother/sister) no     35yr old or greater no      or Low Socioeconomic no     IVF Pregnancy  no     Personal History Risks (low birth weight, prior adverse preg outcome, >10yr preg interval) no         HIGH History of Preeclampsia no Recommend low-dose aspirin if     Multifetal gestation no 1 or more high risk factors    Chronic HTN YES     Type 1 or 2 Diabetes no     Renal Disease YES     Autoimmune Disease  YES      Contraindications to ASA therapy:  NSAID/ ASA allergy: no  Nasal polyps: no  Asthma with history of ASA induced bronchospasm: no  Relative contraindications:  History of GI bleed: no  Active peptic ulcer disease: no  Severe hepatic dysfunction: no    Patient should be recommended to take ASA 162mg during this pregnancy from 12-36wks to lower her risk of preeclampsia: high risk criteria - aware of ASA use       The patient has a history now or in prior pregnancy notable for:  chronic HTN and SVT,hypokalemia, hypomagnesemia, Rheumatoid Arthritis, kidney disease (unable to diagnose genetically due to father being , patient states they are treating it as if its Liddle syndrome)      Details that I feel the provider should be aware of: This was a planned pregnancy for Kyle Calderon and Kurt Najera. This is their first child! Kyle Calderon is a previous GYN patient of [address] [place] [address]. She has a significant medical history, including but not limited to hypokalemia  chronic HTN, RA, and SVT. She follows with nephrology and cardiology regularly. 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 She meets high risk criteria for ASA therapy and she is aware to start this at 12 weeks. She completed CF/SMA testing prior to conception, and is aware of PN1 labs baseline preE labs ordered as well. PN1 visit scheduled. The patient was oriented to our practice, reviewed delivering physicians and [facility] for Delivery. All questions were answered.     Interviewed by: Mendoza Martínez RN 14-Jun-2024

## 2024-06-13 NOTE — BH TREATMENT PLAN - NSCMSPTSTRENGTHS_PSY_ALL_CORE
Supportive family
[place]/spirituality/Financial stability/Future/goal oriented/Intact family/Physically healthy/Self-reliant/Supportive family

## 2024-06-13 NOTE — BH PSYCHOLOGY - GROUP THERAPY NOTE - NSPSYCHOLGRPDBTINT_PSY_A_CORE
reveiwed mindfullness homework/review emotion regulation homework
reveiwed mindfullness homework/reviewed interpersonal effectiveness homework
reveiwed mindfullness homework/review emotion regulation homework

## 2024-06-13 NOTE — BH PSYCHOLOGY - CLINICIAN PSYCHOTHERAPY NOTE - NSBHPSYCHOLNARRATIVE_PSY_A_CORE FT
Clinician met with the patient for 45 minutes per treatment team request. The patient presented with improved depressed mood and affect. The thought process was linear and thought content was logical. Patient had fair insight and judgment. Patient denied current SI/HI. Pt was future oriented.    During the session, the patient discussed reflecting on her time in the hospital. The patient learned her emotions' value and has started to see them as a strength. We discussed learning how to use her emotions more effectively. Stated appreciation for learning new skills and for the development of hope and motivation to continue using them outside of the hospital. Patient has found learning these skills has been very effective and recognizes that repressing her feelings has not been helpful in the long term. The patient mentioned learning different ways to respond to her emotions which are more effective than repression. She is looking forward to returning to her family and sharing with them what she has learned and how she hopes to be different. Additionally, the patient is motivated to pursue individual therapy and anticipates discharge this week. Clinician met with the patient for 45 minutes per treatment team request. The patient presented with improved depressed mood and affect. Her thought process was linear and thought content was logical. Patient had fair insight and judgment. Patient denied current SI/HI. Pt was future oriented.    During the session, the patient discussed reflecting on her time in the hospital. The patient learned her emotions' value and has started to see them as a strength. Dyad discussed learning how to use her emotions more effectively. Stated appreciation for learning new skills and for the development of hope and motivation to continue using them outside of the hospital. Patient has found that learning these skills has been very effective and recognizes that repressing her feelings has not been helpful in the long term. The patient mentioned learning different ways to respond to her emotions which are more effective than repression. She is looking forward to returning to her family and sharing with them what she has learned and how she hopes to be different. Additionally, the patient is motivated to pursue individual therapy and anticipates discharge this week.

## 2024-06-13 NOTE — BH PSYCHOLOGY - GROUP THERAPY NOTE - NSPSYCHOLGRPDBTPT_PSY_A_CORE FT
Patient attended a Dialectal Behavior Therapy Group incorporating DBT- based emotion regulation concepts. The group started with a brief check-in asking pts to practice in a five senses exercise to help pt’s become present and focused. The group was provided with an overview of "Check the Facts” as a DBT skill. The group focused on applying the skill of checking the accuracy of one’s interpretations and assumptions. Participants learn to assess the validity of their thoughts and emotions, encouraging a more balanced and objective understanding of situations. This skill helps individuals navigate and respond to intense emotions with greater clarity and effectiveness. Group facilitator explained concepts, reinforced participation, and engaged patients in the discussion.   
Patient attended a Dialectal Behavior Therapy Group (DBT) group focused on Emotion Regulation. Group began with a brief check in asking pts to share review of skills and/or present emotions. Pt participated in a mindfulness exercise and were encouraged to share thought/reactions. The group focused on the Emotion Regulation module that addressed various aspects of managing and understanding emotions. Participants learned skills to identify, label, and cope with emotions effectively. Group facilitator explained concepts, reinforced participation, and engaged patients in discussion. 
Patient attended a Dialectal Behavior Therapy Group (DBT) group focused on Interpersonal Effectiveness. The group began with a brief check in asking patients to share review of skills and/or present emotions. Pt participated in a mindfulness exercise and were encouraged to share thought/reactions. The group focused on the Interpersonal Effectiveness module on the skill of understanding Dialectics. The group was structured at developing skills in how to think and act dialectically. Group facilitator explained concepts, reinforced participation, and engaged patients in discussion.

## 2024-06-13 NOTE — BH INPATIENT PSYCHIATRY DISCHARGE NOTE - HOSPITAL COURSE
Pt seen by this writer on 6/5/24: "Patient is a 43 year old, female; domicile with spouse and children; employed; no formal psychiatric treatment; no known suicide attempts; no known history of violence or arrests; no active substance abuse or known history of complicated withdrawal; PMH unremarkable; brought in by spouse; for suicidal ideation.     Pt assessed for depressive sxs. Pt presents with depressed mood, tearful, feelings of guilt re: her mother passing away 2 months ago and they were not able to speak, poor concentration, poor sleep (2-3 hours a night), feeling overwhelmed, obsessive cleaning and straightening things (if she does not do this, she feels "heavy" and feels compelled to clean) to the point where friends call her "Ms. OCD" and her girlfriend forbids the pt, "don't touch my broom. Don't touch my mop" when pt goes their homes to visit. Pt reports her mother passing away 2 months ago and not being able to speak to her "we weren't on good terms and now it's too late for forgiveness," working 2 full time CNA jobs, recently closing on a home, raising 9 y/o and 12 y/o children (pt states they are safe at home with her  Checo), helping "everyone" including doing her co-workers' work and helping family and friends. Pt states that she is overwhelmed and has not been able to sleep because she thinks of all the things she still needs to do. Pt states she has never had any mental health treatment because she is "embarrassed to talk about it" "I don't want to be judged" "I don't want to look weak" and "I help everyone else but no one helps me." Discussed medications, pt declined any sleep medications, "I slept so well last night in the emergency room! For the first time!" Pt does agree to start zoloft 25 mg PO QD for depression, anxiety, and OCD sxs. Pt provided with medication education including but not limited to possible side effects like GI upset, sedation, dizziness, and metabolic changes; pt verbalized understanding. Pt encouraged to focus on her own care and attend therapy on the unit and resist her urges to help peers or clean. Will request individual therapy as well. Pt denies SIIP and agrees to come to staff immediately with any safety concerns. Pt denies HIIP, A/VH, paranoia, thought insertion/withdrawal, special abilities. Pt reports some racing thoughts and increased energy when she feels compelled to clean, but denies other s/s of tc. Diet- no pork or beef per pt request; nutrition consult placed. Atarax and melatonin PRNs added for anxiety and insomnia respectively. AM labs - A1c and lipid panel. Pt gave consent to speak to her  Checo [phone]    PMH: denies  Allergies: denies  Substances: denies"    On the unit, the pt was initially tearful, depressed, anxious, with feelings of guilt, and isolative with OCD like sxs to clean. Pt agreed to start zoloft and this was titrated to 50 mg PO QD with good response. Pt began to go to groups and open up in individual therapy. Pt feels she had to take care of her siblings starting when she was 8 y/o because her mother was not there for the children. Pt able to start processing her resentments towards her mother and her family. Pt began to realize she needed to learn to say no and to start prioritizing her own mental health. Pt agreed to PHP referral. Melatonin was added with difficulty sleeping. Pt provided with medication education including, but not limited to, possible side effects like sedation, dizziness, change in liver function levels, weight gain, N/V, GI upset, EPS, TD, NMS, and metabolic changes; pt verbalized understanding. Pt instructed not to drive or use hazardous machinery or materials while on this medication; pt verbalized understanding. On day of discharge, pt denied SIIP, HIIP, A/VH, IOR, paranoia, thought insertion/withdrawal/broadcasting, magical thinking, special abilities, mind reading, Latter day preoccupation, sxs of tc, depression, or anxiety. Pt educated on use of 911 in cases of emergency and to go to the nearest ED if feeling unsafe in the community. Pt verbalized understanding. Pt provided with numbers for Suicide Prevention and [facility] Well and educated on their use; pt verbalized understanding. Pt was educated on the adverse effects these medications may have on a fetus and provided with birth control education; pt verbalized understanding    Pt was discharged on: zoloft 50 mg PO QD and melatonin 3 mg PO QHS

## 2024-06-13 NOTE — BH PSYCHOLOGY - CLINICIAN PSYCHOTHERAPY NOTE - TOKEN PULL-DIAGNOSIS
Primary Diagnosis:  MDD (major depressive disorder) [F32.9]        Problem Dx:   OCD (obsessive compulsive disorder) [F42.9]      MDD (major depressive disorder) [F32.9]      Current severe episode of major depressive disorder without psychotic features, unspecified whether recurrent [F32.2]      
DC instructions

## 2024-06-13 NOTE — BH INPATIENT PSYCHIATRY PROGRESS NOTE - NSBHMETABOLIC_PSY_ALL_CORE_FT
BMI: BMI (kg/m2): 25.6 (06-05-24 @ 13:36)  HbA1c: A1C with Estimated Average Glucose Result: 5.5 % (06-06-24 @ 09:00)    Glucose:   BP: --Vital Signs Last 24 Hrs  T(C): 36.7 (06-13-24 @ 07:43), Max: 36.7 (06-13-24 @ 07:43)  T(F): 98.1 (06-13-24 @ 07:43), Max: 98.1 (06-13-24 @ 07:43)  HR: --  BP: --  BP(mean): --  RR: 18 (06-13-24 @ 07:43) (18 - 18)  SpO2: 99% (06-13-24 @ 07:43) (99% - 99%)    Orthostatic VS  06-13-24 @ 07:43  Lying BP: --/-- HR: --  Sitting BP: 117/64 HR: 62  Standing BP: 125/99 HR: 71  Site: --  Mode: --  Orthostatic VS  06-12-24 @ 08:37  Lying BP: --/-- HR: --  Sitting BP: 102/62 HR: 58  Standing BP: 98/66 HR: 75  Site: --  Mode: --    Lipid Panel: Date/Time: 06-06-24 @ 09:00  Cholesterol, Serum: 179  LDL Cholesterol Calculated: 96  HDL Cholesterol, Serum: 73  Total Cholesterol/HDL Ration Measurement: --  Triglycerides, Serum: 50

## 2024-06-13 NOTE — BH PSYCHOLOGY - GROUP THERAPY NOTE - TOKEN PULL-DIAGNOSIS
Primary Diagnosis:  MDD (major depressive disorder) [F32.9]        Problem Dx:   OCD (obsessive compulsive disorder) [F42.9]      MDD (major depressive disorder) [F32.9]      Current severe episode of major depressive disorder without psychotic features, unspecified whether recurrent [F32.2]      

## 2024-06-13 NOTE — BH INPATIENT PSYCHIATRY DISCHARGE NOTE - NSBHFUPINTERVALHXFT_PSY_A_CORE
Discharge Progress Note Date and Time: 06-14-24 @ 15:39  Pt assessed for depression, OCD, and SI. Chart reviewed and case discussed with treatment team. No interval events reported overnight. Pt seen with medical student present. Pt states she feels improvements in mood and OCD sxs. Pt advocates for discharge, but also agrees that she would like to have more therapy as an outpt; partial program discussed and pt continues to agree to referral, but frustrated with wait for intake appt. Pt adamantly denies S/HIIP, VH, AH; no evidence of paranoia or delusions. Pt educated on the use of 911 or going to the nearest ED if safety concerns should arise in the community; pt verbalized understanding. Pt discharged to home today to her 's care

## 2024-06-13 NOTE — BH PSYCHOLOGY - CLINICIAN PSYCHOTHERAPY NOTE - NSTXDEPRESGOAL_PSY_ALL_CORE
Will identify 2 coping skills that assist in improving mood
Will identify thoughts and self-talk that contribute to depression
Will identify 2 coping skills that assist in improving mood

## 2024-06-13 NOTE — BH INPATIENT PSYCHIATRY DISCHARGE NOTE - NSDCMRMEDTOKEN_GEN_ALL_CORE_FT
melatonin 3 mg oral tablet: 1 tab(s) orally once a day (at bedtime)  Zoloft 50 mg oral tablet: 1 tab(s) orally once a day

## 2024-06-13 NOTE — BH PSYCHOLOGY - GROUP THERAPY NOTE - NSBHPSYCHOLGRPNAME_PSY_A_CORE
DBT (Dialectical Behavior Therapy) Group

## 2024-06-13 NOTE — BH TREATMENT PLAN - ANXIETY/PANIC/FEAR NURSING INTERVENTIONS/RECOMMENDATIONS
Provide education on coping skills that aid in mitigating feelings of anxiety; encourage use of coping skills after education is provided by staff when pt is feeling anxious; redirect as needed/indicated

## 2024-06-13 NOTE — BH PSYCHOLOGY - GROUP THERAPY NOTE - NSPSYCHOLGRPDBTGOAL_PSY_A_CORE
reduce mood and affective lability/reduce impulsive self-defeating behavior/reduce interpersonal conflicts/improve ability to indentify feelings/improve ability to communicate feelings/reduce vulnerability to emotional dysregualation/promote skills to reduce anger
reduce mood and affective lability/reduce impulsive self-defeating behavior/improve ability to indentify feelings/improve ability to communicate feelings/reduce vulnerability to emotional dysregualation/promote skills to reduce anger
reduce mood and affective lability/improve ability to indentify feelings/improve ability to communicate feelings/reduce vulnerability to emotional dysregualation/promote skills to reduce anger

## 2024-06-13 NOTE — BH INPATIENT PSYCHIATRY DISCHARGE NOTE - NSBHASSESSSUMMFT_PSY_ALL_CORE
Patient is a 43 year old, female; domicile with spouse and children; employed; no formal psychiatric treatment; no known suicide attempts; no known history of violence or arrests; no active substance abuse or known history of complicated withdrawal; PMH unremarkable; brought in by spouse; for suicidal ideation.         Discharge Progress Note Date and Time: 06-14-24 @ 15:41    Patient is a 43 year old, female; domicile with spouse and children; employed; no formal psychiatric treatment; no known suicide attempts; no known history of violence or arrests; no active substance abuse or known history of complicated withdrawal; PMH unremarkable; brought in by spouse; for suicidal ideation.     Pt seen with medical student present. Pt states she feels improvements in mood and OCD sxs. Pt advocates for discharge, but also agrees that she would like to have more therapy as an outpt; partial program discussed and pt continues to agree to referral, but frustrated with wait for intake appt. Pt adamantly denies S/HIIP, VH, AH; no evidence of paranoia or delusions. Pt educated on the use of 911 or going to the nearest ED if safety concerns should arise in the community; pt verbalized understanding. Pt discharged to home today to her 's care

## 2024-06-13 NOTE — BH INPATIENT PSYCHIATRY PROGRESS NOTE - NSBHASSESSSUMMFT_PSY_ALL_CORE
Patient is a 43 year old, female; domicile with spouse and children; employed; no formal psychiatric treatment; no known suicide attempts; no known history of violence or arrests; no active substance abuse or known history of complicated withdrawal; PMH unremarkable; brought in by spouse; for suicidal ideation.     today pt reports improved mood and anxiety, endorses continued OCD like sxs of wanting to clean, denies SIIP. agrees to c/w increase zoloft  to 50 mg PO QD. Will monitor for effect. Agrees to PHP referral - pending intake appt. Will add melatonin 3 mg PO QHS for insomnia     PLAN:   -voluntary admission to 2W  -start zoloft and titrate to therapeutic dose  -request individual therapy  -encourage I/M/G therapy  -obtain collaterals from    -nutrition consult   -labs in AM WNL

## 2024-06-13 NOTE — BH PSYCHOLOGY - GROUP THERAPY NOTE - NSBHPSYCHOLRESPONSE_PSY_A_CORE
Coping skills acquired/Insight displayed/Accepted support
Coping skills acquired/Insight displayed/Accepted support
Symptoms reduced/Coping skills acquired/Insight displayed/Accepted support

## 2024-06-13 NOTE — BH TREATMENT PLAN - NSTXDCOPLKINTERSW_PSY_ALL_CORE
SW will meet with Patient and provide supportive psychotherapy and psychoeducation regarding mental health symptoms, different modalities of treatment, and motivational counseling targeting healthy lifestyle and towards pt's goal. SW will identify and create appropriate referrals for follow-up care. SW meet with multidisciplinary team daily for updates on pt's goal progress.

## 2024-06-13 NOTE — BH PSYCHOLOGY - CLINICIAN PSYCHOTHERAPY NOTE - NSBHPSYCHOLGOALS_PSY_A_CORE
Decrease symptoms/Improve social/vocational/coping skills/Psychoeducation
Decrease symptoms/Improve social/vocational/coping skills/Psychoeducation
Decrease symptoms/Assessment/Improve social/vocational/coping skills/Prevent relapse/Psychoeducation

## 2024-06-13 NOTE — BH TREATMENT PLAN - NSDCCRITERIA_PSY_ALL_CORE
CGI<=2, return to baseline functioning as evidenced by behavioral control, staff observation, pt self report 
CGI<=2, return to baseline functioning as evidenced by behavioral control, staff observation, pt self report

## 2024-06-13 NOTE — BH INPATIENT PSYCHIATRY PROGRESS NOTE - NSBHCHARTREVIEWVS_PSY_A_CORE FT
Vital Signs Last 24 Hrs  T(C): 36.7 (06-13-24 @ 07:43), Max: 36.7 (06-13-24 @ 07:43)  T(F): 98.1 (06-13-24 @ 07:43), Max: 98.1 (06-13-24 @ 07:43)  HR: --  BP: --  BP(mean): --  RR: 18 (06-13-24 @ 07:43) (18 - 18)  SpO2: 99% (06-13-24 @ 07:43) (99% - 99%)    Orthostatic VS  06-13-24 @ 07:43  Lying BP: --/-- HR: --  Sitting BP: 117/64 HR: 62  Standing BP: 125/99 HR: 71  Site: --  Mode: --  Orthostatic VS  06-12-24 @ 08:37  Lying BP: --/-- HR: --  Sitting BP: 102/62 HR: 58  Standing BP: 98/66 HR: 75  Site: --  Mode: --

## 2024-06-13 NOTE — BH INPATIENT PSYCHIATRY DISCHARGE NOTE - NSBHDCHANDOFFFT_PSY_ALL_CORE
Encrypted [facility] email sent to Dr. Powell and PHP team including discharge summary, medication list, this writer's contact [phone] for any further questions or concerns  Adjacent Tissue Transfer Text: The defect edges were debeveled with a #15 scalpel blade. Given the location of the defect and the proximity to free margins an adjacent tissue transfer was deemed most appropriate. Using a sterile surgical marker, an appropriate flap was drawn incorporating the defect and placing the expected incisions within the relaxed skin tension lines where possible. The area thus outlined was incised deep to adipose tissue with a #15 scalpel blade. The skin margins were undermined to an appropriate distance in all directions utilizing iris scissors and carried over to close the primary defect.

## 2024-06-13 NOTE — BH SAFETY PLAN - WARNING SIGN 1
I get worked up/ overwhelmed easily. Airway patent, TM normal bilaterally, normal appearing mouth, nose, throat, neck supple with full range of motion, no cervical adenopathy.

## 2024-06-13 NOTE — BH INPATIENT PSYCHIATRY DISCHARGE NOTE - DESCRIPTION
Immigrated from [place], mother recently passed, has supportive spouse, employed, good relationship with children

## 2024-06-13 NOTE — BH TREATMENT PLAN - NSTXANXINTERPR_PSY_ALL_CORE
The writer established a psychiatric rehabilitation goal for the patient to work on over the next seven days. The patient’s psychiatric rehabilitation goal is to identify and practice 3 coping skills to manage anxiety for her anxiety/panic/fear goal. Over the next seven days, Psychiatric Rehabilitation staff will utilize individual psychotherapy and psychoeducation to assist the patient in reaching her treatment goal. The patient denied SI/HI/AH/VH.
The writer met with the patient to assess progress of their psychiatric rehabilitation goal over the past week. The patient made improvement towards her goal to identify and practice 3 coping skills to manage anxiety for her anxiety/panic/fear goal. The patient reported deep breathing, stay grounded, remind myself to not be so hard on myself as her coping skills. The patient is compliant with her medications, engages with her peers, and has poor ADL’s and behavioral control. The writer completed the patient’s safety plan during this session. Over the next seven days, Psychiatric Rehabilitation staff will continue to provide encouragement, support, psychotherapy, and psychoeducation to assist the patient in the progression of her treatment goal and engagement with activities. The patient denied SI/HI/AH/VH.

## 2024-06-13 NOTE — BH PSYCHOLOGY - CLINICIAN PSYCHOTHERAPY NOTE - NSBHPSYCHOLRESPONSE_PSY_A_CORE
Insight displayed/Accepted support
Symptoms reduced/Coping skills acquired/Insight displayed/Accepted support
Symptoms reduced/Insight displayed/Accepted support

## 2024-06-14 VITALS — TEMPERATURE: 97 F | RESPIRATION RATE: 18 BRPM

## 2024-06-14 PROCEDURE — 99232 SBSQ HOSP IP/OBS MODERATE 35: CPT

## 2024-06-14 RX ORDER — LANOLIN ALCOHOL/MO/W.PET/CERES
1 CREAM (GRAM) TOPICAL
Qty: 15 | Refills: 0
Start: 2024-06-14 | End: 2024-06-28

## 2024-06-14 RX ORDER — SERTRALINE 25 MG/1
1 TABLET, FILM COATED ORAL
Qty: 15 | Refills: 0
Start: 2024-06-14 | End: 2024-06-28

## 2024-06-14 RX ADMIN — SERTRALINE 50 MILLIGRAM(S): 25 TABLET, FILM COATED ORAL at 08:34

## 2024-06-14 NOTE — BH INPATIENT PSYCHIATRY PROGRESS NOTE - NSBHASSESSSUMMFT_PSY_ALL_CORE
Patient is a 43 year old, female; domicile with spouse and children; employed; no formal psychiatric treatment; no known suicide attempts; no known history of violence or arrests; no active substance abuse or known history of complicated withdrawal; PMH unremarkable; brought in by spouse; for suicidal ideation.     today pt reports improved mood and anxiety, endorses continued OCD like sxs of wanting to clean, denies SIIP. agrees to c/w increase zoloft  to 50 mg PO QD. Will monitor for effect. Agrees to PHP referral - projected discharge 6/17/24 for PHP intake on 6/18/24    PLAN:   -voluntary admission to 2W  -start zoloft and titrate to therapeutic dose  -request individual therapy  -encourage I/M/G therapy  -obtain collaterals from    -nutrition consult   -labs in AM WNL

## 2024-06-14 NOTE — BH INPATIENT PSYCHIATRY PROGRESS NOTE - NSBHMSEAFFQUAL_PSY_A_CORE
Depressed/Anxious
Euthymic/Other
Euthymic/Other
Other
Other
Depressed/Anxious
Euthymic/Other

## 2024-06-14 NOTE — BH INPATIENT PSYCHIATRY PROGRESS NOTE - NSBHMETABOLIC_PSY_ALL_CORE_FT
BMI: BMI (kg/m2): 25.6 (06-05-24 @ 13:36)  HbA1c: A1C with Estimated Average Glucose Result: 5.5 % (06-06-24 @ 09:00)    Glucose:   BP: --Vital Signs Last 24 Hrs  T(C): 36.2 (06-14-24 @ 07:53), Max: 36.2 (06-14-24 @ 07:53)  T(F): 97.2 (06-14-24 @ 07:53), Max: 97.2 (06-14-24 @ 07:53)  HR: --  BP: --  BP(mean): --  RR: 18 (06-14-24 @ 07:53) (18 - 18)  SpO2: --    Orthostatic VS  06-14-24 @ 07:53  Lying BP: --/-- HR: --  Sitting BP: 107/63 HR: 68  Standing BP: 95/59 HR: 64  Site: --  Mode: --  Orthostatic VS  06-13-24 @ 07:43  Lying BP: --/-- HR: --  Sitting BP: 117/64 HR: 62  Standing BP: 125/99 HR: 71  Site: --  Mode: --    Lipid Panel: Date/Time: 06-06-24 @ 09:00  Cholesterol, Serum: 179  LDL Cholesterol Calculated: 96  HDL Cholesterol, Serum: 73  Total Cholesterol/HDL Ration Measurement: --  Triglycerides, Serum: 50

## 2024-06-14 NOTE — BH INPATIENT PSYCHIATRY PROGRESS NOTE - NSBHMSETHTCONTENT_PSY_A_CORE
Preoccupations
Unremarkable
Hopelessness/Guilt/Other
Unremarkable
Preoccupations
Hopelessness/Guilt/Other
Preoccupations

## 2024-06-14 NOTE — BH INPATIENT PSYCHIATRY PROGRESS NOTE - NSBHCHARTREVIEWVS_PSY_A_CORE FT
Vital Signs Last 24 Hrs  T(C): 36.2 (06-14-24 @ 07:53), Max: 36.2 (06-14-24 @ 07:53)  T(F): 97.2 (06-14-24 @ 07:53), Max: 97.2 (06-14-24 @ 07:53)  HR: --  BP: --  BP(mean): --  RR: 18 (06-14-24 @ 07:53) (18 - 18)  SpO2: --    Orthostatic VS  06-14-24 @ 07:53  Lying BP: --/-- HR: --  Sitting BP: 107/63 HR: 68  Standing BP: 95/59 HR: 64  Site: --  Mode: --  Orthostatic VS  06-13-24 @ 07:43  Lying BP: --/-- HR: --  Sitting BP: 117/64 HR: 62  Standing BP: 125/99 HR: 71  Site: --  Mode: --

## 2024-06-14 NOTE — BH INPATIENT PSYCHIATRY PROGRESS NOTE - NSTXDEPRESDATEEST_PSY_ALL_CORE
06-Jun-2024
06-Jun-2024
13-Jun-2024
06-Jun-2024
13-Jun-2024
06-Jun-2024

## 2024-06-14 NOTE — BH INPATIENT PSYCHIATRY PROGRESS NOTE - NSTXDEPRESINTERMD_PSY_ALL_CORE
med management, psychoed, therapy

## 2024-06-14 NOTE — BH INPATIENT PSYCHIATRY PROGRESS NOTE - NSCGIIMPROVESX_PSY_ALL_CORE
4 = No change - symptoms remain essentially unchanged
3 = Minimally improved - slightly better with little or no clinically meaningful reduction of symptoms.  Represents very little change in basic clinical status, level of care, or functional capacity.
4 = No change - symptoms remain essentially unchanged
3 = Minimally improved - slightly better with little or no clinically meaningful reduction of symptoms.  Represents very little change in basic clinical status, level of care, or functional capacity.
3 = Minimally improved - slightly better with little or no clinically meaningful reduction of symptoms.  Represents very little change in basic clinical status, level of care, or functional capacity.
4 = No change - symptoms remain essentially unchanged

## 2024-06-14 NOTE — BH INPATIENT PSYCHIATRY PROGRESS NOTE - NSTXDEPRESDATETRGT_PSY_ALL_CORE
20-Jun-2024
13-Jun-2024
20-Jun-2024
13-Jun-2024

## 2024-06-14 NOTE — BH INPATIENT PSYCHIATRY PROGRESS NOTE - NSTXANXGOAL_PSY_ALL_CORE
Identify and practice 3 coping skills to manage anxiety
Identify and practice 3 coping skills to manage anxiety
Be able to participate in activities despite lingering anxiety/panic
Be able to participate in activities despite lingering anxiety/panic
Identify and practice 3 coping skills to manage anxiety
Identify and practice 3 coping skills to manage anxiety
Be able to perform ADLs and maintain safety despite anxiety/panic daily
Identify and practice 3 coping skills to manage anxiety
Identify and practice 3 coping skills to manage anxiety

## 2024-06-14 NOTE — BH INPATIENT PSYCHIATRY PROGRESS NOTE - NSICDXBHSECONDARYDX_PSY_ALL_CORE
OCD (obsessive compulsive disorder)   F42.9  

## 2024-06-14 NOTE — BH DISCHARGE NOTE NURSING/SOCIAL WORK/PSYCH REHAB - NSDCADDINFO1FT_PSY_ALL_CORE
The program is in the Ambulatory Care Building. You will see  parking in front of the building. Check-in is located on the 1st floor, however the program is located on the 2nd floor.  Program is Monday through Friday 9:00 am to 3:00 PM. ONLY on the first day are you to arrive at 10:30 am to complete registration. Lunch is provided however you may bring your own.

## 2024-06-14 NOTE — BH INPATIENT PSYCHIATRY PROGRESS NOTE - NSBHATTESTTYPEVISIT_PSY_A_CORE
MONA without on-site Attending supervision
On-site Attending supervising MONA (99XXX codes)
MONA without on-site Attending supervision
On-site Attending supervising MONA (99XXX codes)

## 2024-06-14 NOTE — BH DISCHARGE NOTE NURSING/SOCIAL WORK/PSYCH REHAB - PATIENT PORTAL LINK FT
You can access the FollowMyHealth Patient Portal offered by [facility] by registering at the following website: [email or web address]. By joining [facility]’s FollowMyHealth portal, you will also be able to view your health information using other applications (apps) compatible with our system.

## 2024-06-14 NOTE — BH INPATIENT PSYCHIATRY PROGRESS NOTE - NSBHFUPINTERVALHXFT_PSY_A_CORE
Pt assessed for depression, OCD, and SI. Chart reviewed and case discussed with care team. Pt seen with medical student present. Pt states she feels improvements in mood and OCD sxs. Pt advocates for discharge, but also agrees that she would like to have more therapy as an outpt; partial program discussed and pt continues to agree to referral, but frustrated with wait for intake appt. Denies SHIIP, VH, AH; no evidence of paranoia or delusions. Pt consistently attends and participates in groups. Plan to pursue partial program acceptance and continue with Zoloft at current dose of 50mg. Projected discharge 6/17/24 with PHP intake on 6/18/24

## 2024-06-14 NOTE — BH DISCHARGE NOTE NURSING/SOCIAL WORK/PSYCH REHAB - NSCDUDCCRISIS_PSY_A_CORE
[facility] Well  1 (541) [facility]-WELL ([phone])  Text "WELL" to 47972  Website: www.[facility]/.National Suicide Prevention Lifeline 2 (787) 421-1222/.  [employer]  1 (664) LIF[facility] (138-3219)/.  [facility]  [phone] [phone]/.  [facility]pl[facility] / [facility] Crisis  (245) 443-HVPP (7210)/.  [facility]  [address] [place] [phone]   Hours:  Monday through Friday from 9 AM to 3 PM/988 Suicide and Crisis Lifeline

## 2024-06-14 NOTE — BH INPATIENT PSYCHIATRY PROGRESS NOTE - PRN MEDS
MEDICATIONS  (PRN):  hydrOXYzine hydrochloride 25 milliGRAM(s) Oral every 6 hours PRN anxiety  LORazepam     Tablet 2 milliGRAM(s) Oral every 6 hours PRN Agitation  LORazepam   Injectable 2 milliGRAM(s) IntraMuscular Once PRN Agitation  
MEDICATIONS  (PRN):  hydrOXYzine hydrochloride 25 milliGRAM(s) Oral every 6 hours PRN anxiety  LORazepam     Tablet 2 milliGRAM(s) Oral every 6 hours PRN Agitation  LORazepam   Injectable 2 milliGRAM(s) IntraMuscular Once PRN Agitation  melatonin. 3 milliGRAM(s) Oral at bedtime PRN Insomnia  
MEDICATIONS  (PRN):  hydrOXYzine hydrochloride 25 milliGRAM(s) Oral every 6 hours PRN anxiety  LORazepam     Tablet 2 milliGRAM(s) Oral every 6 hours PRN Agitation  LORazepam   Injectable 2 milliGRAM(s) IntraMuscular Once PRN Agitation

## 2024-06-14 NOTE — BH INPATIENT PSYCHIATRY PROGRESS NOTE - NSTXDEPRESGOAL_PSY_ALL_CORE
Will identify 2 coping skills that assist in improving mood
Will identify thoughts and self-talk that contribute to depression
Will identify 2 coping skills that assist in improving mood
Will identify thoughts and self-talk that contribute to depression

## 2024-06-14 NOTE — BH INPATIENT PSYCHIATRY PROGRESS NOTE - NSBHATTESTBILLING_PSY_A_CORE
56950-Uimalkiecz OBS or IP - moderate complexity OR 35-49 mins
43290-Yyohmzblfa OBS or IP - moderate complexity OR 35-49 mins
22652-Ryivryhvlp OBS or IP - moderate complexity OR 35-49 mins
18539-Wdloylqolb OBS or IP - moderate complexity OR 35-49 mins
12657-Owbzythzoe OBS or IP - moderate complexity OR 35-49 mins
83210-Evtwowscdn OBS or IP - moderate complexity OR 35-49 mins
90350-Sdgdcjkosv OBS or IP - moderate complexity OR 35-49 mins
16261-Tcuwtnkjan OBS or IP - moderate complexity OR 35-49 mins
50510-Owyloecery OBS or IP - moderate complexity OR 35-49 mins

## 2024-06-14 NOTE — BH INPATIENT PSYCHIATRY PROGRESS NOTE - ATTENDING COMMENTS
Care was discussed and reviewed in the interdisciplinary treatment team.  I, Nina Miller MD, have reviewed and verified the documentation.  I independently performed the documented medical decision making.     

## 2024-06-14 NOTE — BH DISCHARGE NOTE NURSING/SOCIAL WORK/PSYCH REHAB - DISCHARGE INSTRUCTIONS AFTERCARE APPOINTMENTS
In order to check the location, date, or time of your aftercare appointment, please refer to your Discharge Instructions Document given to you upon leaving the hospital.  If you have lost the instructions please call [phone]

## 2024-06-14 NOTE — BH INPATIENT PSYCHIATRY PROGRESS NOTE - NSTXANXINTERMD_PSY_ALL_CORE
med management, psychoed, therapy 

## 2024-06-14 NOTE — BH INPATIENT PSYCHIATRY PROGRESS NOTE - NSBHMSETHTPROC_PSY_A_CORE
Linear/Impaired reasoning
Linear
Linear
Linear/Impaired reasoning
Linear
Linear/Impaired reasoning
Linear
Linear
Linear/Impaired reasoning

## 2024-06-14 NOTE — BH INPATIENT PSYCHIATRY PROGRESS NOTE - NSBHFUPINTERVALCCFT_PSY_A_CORE
"I feel better today"
"I'm just waiting to leave. I feel better."
"I feel better today"
Depression/SI/Anxiety
"I'm not crazy!"
"I feel better."
"I feel better today"
"I love myself too much to even think about that."
Depression/SI/Anxiety

## 2024-06-14 NOTE — BH INPATIENT PSYCHIATRY PROGRESS NOTE - NSCGISEVERILLNESS_PSY_ALL_CORE
5 = Markedly ill - intrusive symptoms that distinctly impair social/occupational function or cause intrusive levels of distress
4 = Moderately ill – overt symptoms causing noticeable, but modest, functional impairment or distress; symptom level may warrant medication
5 = Markedly ill - intrusive symptoms that distinctly impair social/occupational function or cause intrusive levels of distress
4 = Moderately ill – overt symptoms causing noticeable, but modest, functional impairment or distress; symptom level may warrant medication
5 = Markedly ill - intrusive symptoms that distinctly impair social/occupational function or cause intrusive levels of distress
5 = Markedly ill - intrusive symptoms that distinctly impair social/occupational function or cause intrusive levels of distress
4 = Moderately ill – overt symptoms causing noticeable, but modest, functional impairment or distress; symptom level may warrant medication

## 2024-06-14 NOTE — BH INPATIENT PSYCHIATRY PROGRESS NOTE - CURRENT MEDICATION
MEDICATIONS  (STANDING):  sertraline 25 milliGRAM(s) Oral daily    MEDICATIONS  (PRN):  hydrOXYzine hydrochloride 25 milliGRAM(s) Oral every 6 hours PRN anxiety  LORazepam     Tablet 2 milliGRAM(s) Oral every 6 hours PRN Agitation  LORazepam   Injectable 2 milliGRAM(s) IntraMuscular Once PRN Agitation  melatonin. 3 milliGRAM(s) Oral at bedtime PRN Insomnia  
MEDICATIONS  (STANDING):  sertraline 25 milliGRAM(s) Oral daily    MEDICATIONS  (PRN):  hydrOXYzine hydrochloride 25 milliGRAM(s) Oral every 6 hours PRN anxiety  LORazepam     Tablet 2 milliGRAM(s) Oral every 6 hours PRN Agitation  LORazepam   Injectable 2 milliGRAM(s) IntraMuscular Once PRN Agitation  melatonin. 3 milliGRAM(s) Oral at bedtime PRN Insomnia  
MEDICATIONS  (STANDING):  melatonin. 3 milliGRAM(s) Oral at bedtime  sertraline 50 milliGRAM(s) Oral daily    MEDICATIONS  (PRN):  hydrOXYzine hydrochloride 25 milliGRAM(s) Oral every 6 hours PRN anxiety  LORazepam     Tablet 2 milliGRAM(s) Oral every 6 hours PRN Agitation  LORazepam   Injectable 2 milliGRAM(s) IntraMuscular Once PRN Agitation  
MEDICATIONS  (STANDING):  sertraline 25 milliGRAM(s) Oral daily    MEDICATIONS  (PRN):  hydrOXYzine hydrochloride 25 milliGRAM(s) Oral every 6 hours PRN anxiety  LORazepam     Tablet 2 milliGRAM(s) Oral every 6 hours PRN Agitation  LORazepam   Injectable 2 milliGRAM(s) IntraMuscular Once PRN Agitation  melatonin. 3 milliGRAM(s) Oral at bedtime PRN Insomnia  
MEDICATIONS  (STANDING):  sertraline 50 milliGRAM(s) Oral daily    MEDICATIONS  (PRN):  hydrOXYzine hydrochloride 25 milliGRAM(s) Oral every 6 hours PRN anxiety  LORazepam     Tablet 2 milliGRAM(s) Oral every 6 hours PRN Agitation  LORazepam   Injectable 2 milliGRAM(s) IntraMuscular Once PRN Agitation  melatonin. 3 milliGRAM(s) Oral at bedtime PRN Insomnia  
MEDICATIONS  (STANDING):  sertraline 25 milliGRAM(s) Oral daily    MEDICATIONS  (PRN):  hydrOXYzine hydrochloride 25 milliGRAM(s) Oral every 6 hours PRN anxiety  LORazepam     Tablet 2 milliGRAM(s) Oral every 6 hours PRN Agitation  LORazepam   Injectable 2 milliGRAM(s) IntraMuscular Once PRN Agitation  melatonin. 3 milliGRAM(s) Oral at bedtime PRN Insomnia  
MEDICATIONS  (STANDING):  melatonin. 3 milliGRAM(s) Oral at bedtime  sertraline 50 milliGRAM(s) Oral daily    MEDICATIONS  (PRN):  hydrOXYzine hydrochloride 25 milliGRAM(s) Oral every 6 hours PRN anxiety  LORazepam     Tablet 2 milliGRAM(s) Oral every 6 hours PRN Agitation  LORazepam   Injectable 2 milliGRAM(s) IntraMuscular Once PRN Agitation

## 2024-06-18 ENCOUNTER — OUTPATIENT (OUTPATIENT)
Dept: OUTPATIENT SERVICES | Facility: HOSPITAL | Age: 44
LOS: 1 days | Discharge: PSYCHIATRIC OR SUBSTANCE USE FACILITY | End: 2024-06-18
Payer: COMMERCIAL

## 2024-06-18 PROCEDURE — 90792 PSYCH DIAG EVAL W/MED SRVCS: CPT

## 2024-06-19 DIAGNOSIS — F43.23 ADJUSTMENT DISORDER WITH MIXED ANXIETY AND DEPRESSED MOOD: ICD-10-CM

## 2024-06-25 PROCEDURE — 99213 OFFICE O/P EST LOW 20 MIN: CPT

## 2024-07-02 PROCEDURE — 99213 OFFICE O/P EST LOW 20 MIN: CPT

## 2024-07-12 ENCOUNTER — OUTPATIENT (OUTPATIENT)
Dept: OUTPATIENT SERVICES | Facility: HOSPITAL | Age: 44
LOS: 1 days | Discharge: ANOTHER HOSPITAL | End: 2024-07-12

## 2024-07-12 PROCEDURE — 99214 OFFICE O/P EST MOD 30 MIN: CPT

## 2024-07-19 ENCOUNTER — NON-APPOINTMENT (OUTPATIENT)
Age: 44
End: 2024-07-19

## 2024-07-19 PROBLEM — Z00.00 ENCOUNTER FOR PREVENTIVE HEALTH EXAMINATION: Status: ACTIVE | Noted: 2024-07-19

## 2024-07-23 PROCEDURE — 99213 OFFICE O/P EST LOW 20 MIN: CPT

## 2024-08-12 ENCOUNTER — APPOINTMENT (OUTPATIENT)
Dept: PSYCHIATRY | Facility: CLINIC | Age: 44
End: 2024-08-12

## 2024-08-12 DIAGNOSIS — F32.A ANXIETY DISORDER, UNSPECIFIED: ICD-10-CM

## 2024-08-12 DIAGNOSIS — F41.9 ANXIETY DISORDER, UNSPECIFIED: ICD-10-CM

## 2024-08-12 PROCEDURE — 99215 OFFICE O/P EST HI 40 MIN: CPT

## 2024-08-12 RX ORDER — SERTRALINE HYDROCHLORIDE 100 MG/1
100 TABLET, FILM COATED ORAL DAILY
Qty: 30 | Refills: 1 | Status: ACTIVE | COMMUNITY
Start: 2024-08-12 | End: 1900-01-01

## 2024-08-12 RX ORDER — CLONAZEPAM 0.5 MG/1
0.5 TABLET ORAL DAILY
Qty: 10 | Refills: 0 | Status: ACTIVE | COMMUNITY
Start: 2024-08-12 | End: 1900-01-01

## 2024-08-12 RX ORDER — SERTRALINE HYDROCHLORIDE 50 MG/1
50 TABLET, FILM COATED ORAL DAILY
Qty: 30 | Refills: 1 | Status: ACTIVE | COMMUNITY
Start: 2024-08-12 | End: 1900-01-01

## 2024-08-12 NOTE — PHYSICAL EXAM
[None] : none [Cooperative] : cooperative [Clear] : clear [Linear/Goal Directed] : linear/goal directed [WNL] : within normal limits [Average] : average [Depressed] : depressed [Flat] : flat [de-identified] : tearful [de-identified] : fair [de-identified] : fair

## 2024-08-12 NOTE — HISTORY OF PRESENT ILLNESS
[FreeTextEntry1] : Patient is here in the office for face to face interview for initial psychiatric evaluation   ID: Pt is a 43year-old AA female,  with 2 daughters, employed, living in a house in [facility] with her  and daughters seen today for psychiatric evaluation and medication management.   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 +Insomnia changed to depression. States she stopped working on  before she was admitted to [facility]. Patient states she was recently admitted to [facility] on  for depression, anxiety and SI. Upon discharge she went to the [facility] where she completed 1 month. She was discharged on Zoloft 100 mg PO QD and has been on the Zoloft x 3 months.  Stressors contributing to her depression and anxiety: Mother passed away in 2024. 12 days after she passed, she bought a house. Instead of dealing with her house she had to make  arrangements, she had lack of family support as all her siblings were in [place] where her mother passed away, feels like she didn't mourn her death properly. +Regeret because she feels there was so much i wanted to talk to ehr about but i didnt get a chance to.    Currently on Zoloft 100 mg PO QD x 3 months  Depression: denies any low mood or and anhedonia. +Guilty Anxiety: endorses to anxiety in the form of worrying, fear of the unknown, somatization (tension in the back fo the neck, diarrhea).  Sleep: decreased. Sleeps between 9-10:pm-4-5 am. Feels liek she dooes not want to get ip.  Appetite is good. Weight 150 lbs and Ht 5'2 Energy, concentration, and motivation are all decreased. Denies any AVH, SI or HI.   Hypomanic symptoms: denies Substance use hx:  Caffeine: No coffee x 2 weeks but was drinking 1 cup of coffee 3-4 times a week.    [FreeTextEntry2] : H/O: depression and anxiety Inpatient hospitalization: June 4th. Depression and anxiety, and SI.  Past SI: denies Therapist: denies Psychiatrist: denies Medication trials: denies Current medications: Zoloft 100 mg x 3 months Firearms: denies

## 2024-08-12 NOTE — PLAN
[FreeTextEntry4] : Assessment: Patient is a 42 yo female with h/o depression and anxiety seen today for medication management. Patient is compliant with the medications, tolerating it well without any side effects. I-STOP was checked without any problems.  PLAN: Increase Zoloft 100 to 150 mg PO QD fro depression and anxiety - Discussed risks and benefits of medications including side effects of GI and sexual with SSRI. Alternative strategies including no intervention discussed with patient. Patient consents to current medications as prescribed. - Discussed with patient regarding importance of abstinence and sobriety from alcohol and drugs. Educated about relationship between worsening mood/anxiety symptoms and drug use and improvement of symptoms with abstinence.  - Discussed about unpredictable effects including cardiorespiratory collapse from the combination of illicit drugs and prescribed medications. Patient verbalized understanding. - Patient understands to contact clinic prn with concerns and agrees to call 911 or go to nearest ER if symptoms worsen. - Next appointment was made by the patient in 6-8 weeks Patient was not in any distress.   I spent a total of 60 minutes for today's visit in evaluating and treating the patient as per above, including: preparing for patient's appointment (review of prior documents, [facility] ), performing a medically necessary exam/evaluation, communicating/counseling/educating the patient ordering medications
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
## 2024-09-23 ENCOUNTER — APPOINTMENT (OUTPATIENT)
Dept: PSYCHIATRY | Facility: CLINIC | Age: 44
End: 2024-09-23
Payer: COMMERCIAL

## 2024-09-23 PROCEDURE — 99214 OFFICE O/P EST MOD 30 MIN: CPT

## 2024-09-23 NOTE — PHYSICAL EXAM
[None] : none [Cooperative] : cooperative [Depressed] : depressed [Flat] : flat [Clear] : clear [Linear/Goal Directed] : linear/goal directed [WNL] : within normal limits [Average] : average [FreeTextEntry8] : better [de-identified] : tearful. better [de-identified] : fair [de-identified] : fair

## 2024-09-23 NOTE — HISTORY OF PRESENT ILLNESS
[FreeTextEntry1] : Patient is here in the office for face to face interview for initial psychiatric evaluation   ID: Pt is a 43year-old AA female,  with 2 daughters, employed, living in a house in [facility] with her  and daughters seen today for psychiatric evaluation and medication management.   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 +Insomnia changed to depression. States she stopped working on  before she was admitted to [facility]. Patient states she was recently admitted to [facility] on  for depression, anxiety and SI. Upon discharge she went to the [facility] where she completed 1 month. She was discharged on Zoloft 100 mg PO QD and has been on the Zoloft x 3 months.  Stressors contributing to her depression and anxiety: Mother passed away in 2024. 12 days after she passed, she bought a house. Instead of dealing with her house she had to make  arrangements, she had lack of family support as all her siblings were in [place] where her mother passed away, feels like she didn't mourn her death properly. +Regeret because she feels there was so much i wanted to talk to ehr about but i didnt get a chance to.    Currently on Zoloft 100 mg PO QD x 3 months  Depression: denies any low mood or and anhedonia. +Guilty Anxiety: endorses to anxiety in the form of worrying, fear of the unknown, somatization (tension in the back fo the neck, diarrhea).  Sleep: decreased. Sleeps between 9-10:pm-4-5 am. Feels liek she dooes not want to get ip.  Appetite is good. Weight 150 lbs and Ht 5'2 Energy, concentration, and motivation are all decreased. Denies any AVH, SI or HI.   Hypomanic symptoms: denies Substance use hx:  Caffeine: No coffee x 2 weeks but was drinking 1 cup of coffee 3-4 times a week.    [FreeTextEntry2] : H/O: depression and anxiety Inpatient hospitalization: June 4th. Depression and anxiety, and SI.  Past SI: denies Therapist: denies Psychiatrist: denies Medication trials: denies Current medications: Zoloft 100 mg x 3 months Firearms: denies

## 2024-09-23 NOTE — PLAN
[FreeTextEntry4] : Assessment: Patient is a 42 yo female with h/o depression and anxiety seen today for medication management. Patient is compliant with the medications, tolerating it well without any side effects. I-STOP was checked without any problems.  PLAN: Continue Zoloft 150 mg PO QD for depression and anxiety - Discussed risks and benefits of medications including side effects of GI and sexual with SSRI. Alternative strategies including no intervention discussed with patient. Patient consents to current medications as prescribed. - Discussed with patient regarding importance of abstinence and sobriety from alcohol and drugs. Educated about relationship between worsening mood/anxiety symptoms and drug use and improvement of symptoms with abstinence.  - Discussed about unpredictable effects including cardiorespiratory collapse from the combination of illicit drugs and prescribed medications. Patient verbalized understanding. - Patient understands to contact clinic prn with concerns and agrees to call 911 or go to nearest ER if symptoms worsen. - Next appointment was made by the patient in 6-8 weeks Patient was not in any distress.

## 2024-10-03 ENCOUNTER — NON-APPOINTMENT (OUTPATIENT)
Age: 44
End: 2024-10-03

## 2025-05-05 NOTE — DIETITIAN INITIAL EVALUATION ADULT - BODY MASS INDEX
This RN phoned and left a voicemail message stating that I was calling to verify his smoking history to see if he would be eligible to participate in [facility]'s early detection for lung cancer screening program.  RN advised that she would call again.     
25.6